# Patient Record
Sex: FEMALE | Race: WHITE | NOT HISPANIC OR LATINO | Employment: FULL TIME | URBAN - METROPOLITAN AREA
[De-identification: names, ages, dates, MRNs, and addresses within clinical notes are randomized per-mention and may not be internally consistent; named-entity substitution may affect disease eponyms.]

---

## 2023-03-02 LAB
ACTIVATED PARTIAL THROMBOPLASTIN TIME IN PPP BY COAGULATION ASSAY: 38 SEC (ref 26–39)
ALANINE AMINOTRANSFERASE (SGPT) (U/L) IN SER/PLAS: 20 U/L (ref 7–45)
ALBUMIN (G/DL) IN SER/PLAS: 4.5 G/DL (ref 3.4–5)
ALKALINE PHOSPHATASE (U/L) IN SER/PLAS: 49 U/L (ref 33–110)
AMPHETAMINE (PRESENCE) IN URINE BY SCREEN METHOD: NORMAL
ANION GAP IN SER/PLAS: 13 MMOL/L (ref 10–20)
ASPARTATE AMINOTRANSFERASE (SGOT) (U/L) IN SER/PLAS: 16 U/L (ref 9–39)
BARBITURATES PRESENCE IN URINE BY SCREEN METHOD: NORMAL
BASOPHILS (10*3/UL) IN BLOOD BY AUTOMATED COUNT: 0.04 X10E9/L (ref 0–0.1)
BASOPHILS/100 LEUKOCYTES IN BLOOD BY AUTOMATED COUNT: 0.5 % (ref 0–2)
BENZODIAZEPINE (PRESENCE) IN URINE BY SCREEN METHOD: NORMAL
BILIRUBIN TOTAL (MG/DL) IN SER/PLAS: 0.5 MG/DL (ref 0–1.2)
C PEPTIDE (NG/ML) IN SER/PLAS: 4.7 NG/ML (ref 0.7–3.9)
CALCIDIOL (25 OH VITAMIN D3) (NG/ML) IN SER/PLAS: 30 NG/ML
CALCIUM (MG/DL) IN SER/PLAS: 9.8 MG/DL (ref 8.6–10.3)
CANNABINOIDS IN URINE BY SCREEN METHOD: NORMAL
CARBON DIOXIDE, TOTAL (MMOL/L) IN SER/PLAS: 29 MMOL/L (ref 21–32)
CHLORIDE (MMOL/L) IN SER/PLAS: 102 MMOL/L (ref 98–107)
CHOLESTEROL (MG/DL) IN SER/PLAS: 115 MG/DL (ref 0–199)
CHOLESTEROL IN HDL (MG/DL) IN SER/PLAS: 39.2 MG/DL
CHOLESTEROL/HDL RATIO: 2.9
COBALAMIN (VITAMIN B12) (PG/ML) IN SER/PLAS: 422 PG/ML (ref 211–911)
COCAINE (PRESENCE) IN URINE BY SCREEN METHOD: NORMAL
CREATININE (MG/DL) IN SER/PLAS: 0.67 MG/DL (ref 0.5–1.05)
DRUG SCREEN COMMENT URINE: NORMAL
EOSINOPHILS (10*3/UL) IN BLOOD BY AUTOMATED COUNT: 0.14 X10E9/L (ref 0–0.7)
EOSINOPHILS/100 LEUKOCYTES IN BLOOD BY AUTOMATED COUNT: 1.9 % (ref 0–6)
ERYTHROCYTE DISTRIBUTION WIDTH (RATIO) BY AUTOMATED COUNT: 13.8 % (ref 11.5–14.5)
ERYTHROCYTE MEAN CORPUSCULAR HEMOGLOBIN CONCENTRATION (G/DL) BY AUTOMATED: 33 G/DL (ref 32–36)
ERYTHROCYTE MEAN CORPUSCULAR VOLUME (FL) BY AUTOMATED COUNT: 87 FL (ref 80–100)
ERYTHROCYTES (10*6/UL) IN BLOOD BY AUTOMATED COUNT: 4.71 X10E12/L (ref 4–5.2)
ESTIMATED AVERAGE GLUCOSE FOR HBA1C: 120 MG/DL
FENTANYL URINE: NORMAL
FERRITIN (UG/LL) IN SER/PLAS: 324 UG/L (ref 8–150)
FOLATE (NG/ML) IN SER/PLAS: >22.3 NG/ML
GFR FEMALE: >90 ML/MIN/1.73M2
GLUCOSE (MG/DL) IN SER/PLAS: 84 MG/DL (ref 74–99)
HEMATOCRIT (%) IN BLOOD BY AUTOMATED COUNT: 40.9 % (ref 36–46)
HEMOGLOBIN (G/DL) IN BLOOD: 13.5 G/DL (ref 12–16)
HEMOGLOBIN A1C/HEMOGLOBIN TOTAL IN BLOOD: 5.8 %
IMMATURE GRANULOCYTES/100 LEUKOCYTES IN BLOOD BY AUTOMATED COUNT: 0.3 % (ref 0–0.9)
INR IN PPP BY COAGULATION ASSAY: 1 (ref 0.9–1.1)
IRON (UG/DL) IN SER/PLAS: 67 UG/DL (ref 35–150)
IRON BINDING CAPACITY (UG/DL) IN SER/PLAS: 363 UG/DL (ref 240–445)
IRON SATURATION (%) IN SER/PLAS: 18 % (ref 25–45)
LDL: 55 MG/DL (ref 0–99)
LEUKOCYTES (10*3/UL) IN BLOOD BY AUTOMATED COUNT: 7.5 X10E9/L (ref 4.4–11.3)
LYMPHOCYTES (10*3/UL) IN BLOOD BY AUTOMATED COUNT: 2.34 X10E9/L (ref 1.2–4.8)
LYMPHOCYTES/100 LEUKOCYTES IN BLOOD BY AUTOMATED COUNT: 31.4 % (ref 13–44)
METHADONE (PRESENCE) IN URINE BY SCREEN METHOD: NORMAL
MONOCYTES (10*3/UL) IN BLOOD BY AUTOMATED COUNT: 0.49 X10E9/L (ref 0.1–1)
MONOCYTES/100 LEUKOCYTES IN BLOOD BY AUTOMATED COUNT: 6.6 % (ref 2–10)
NEUTROPHILS (10*3/UL) IN BLOOD BY AUTOMATED COUNT: 4.42 X10E9/L (ref 1.2–7.7)
NEUTROPHILS/100 LEUKOCYTES IN BLOOD BY AUTOMATED COUNT: 59.3 % (ref 40–80)
OPIATES (PRESENCE) IN URINE BY SCREEN METHOD: NORMAL
OXYCODONE (PRESENCE) IN URINE BY SCREEN METHOD: NORMAL
PARATHYRIN INTACT (PG/ML) IN SER/PLAS: 52.5 PG/ML (ref 18.5–88)
PHENCYCLIDINE (PRESENCE) IN URINE BY SCREEN METHOD: NORMAL
PLATELETS (10*3/UL) IN BLOOD AUTOMATED COUNT: 317 X10E9/L (ref 150–450)
POTASSIUM (MMOL/L) IN SER/PLAS: 3.9 MMOL/L (ref 3.5–5.3)
PROTEIN TOTAL: 7.6 G/DL (ref 6.4–8.2)
PROTHROMBIN TIME (PT) IN PPP BY COAGULATION ASSAY: 11.8 SEC (ref 9.8–13.4)
SODIUM (MMOL/L) IN SER/PLAS: 140 MMOL/L (ref 136–145)
THYROTROPIN (MIU/L) IN SER/PLAS BY DETECTION LIMIT <= 0.05 MIU/L: 2.99 MIU/L (ref 0.44–3.98)
TRIGLYCERIDE (MG/DL) IN SER/PLAS: 104 MG/DL (ref 0–149)
UREA NITROGEN (MG/DL) IN SER/PLAS: 17 MG/DL (ref 6–23)
VLDL: 21 MG/DL (ref 0–40)

## 2023-03-03 LAB — H. PYLORI UBIT: NEGATIVE

## 2023-03-05 LAB
COPPER: 137.6 UG/DL (ref 80–155)
ZINC,SERUM OR PLASMA: 83 UG/DL (ref 60–120)

## 2023-03-08 LAB
COTININE BLOOD QUANTITATIVE: <5 NG/ML
NICOTINE BLOOD QUANTITATIVE: <5 NG/ML

## 2023-03-09 LAB — VITAMIN B1, WHOLE BLOOD: 171 NMOL/L (ref 70–180)

## 2023-05-01 ENCOUNTER — HOSPITAL ENCOUNTER (OUTPATIENT)
Dept: DATA CONVERSION | Facility: HOSPITAL | Age: 43
End: 2023-05-01
Attending: SURGERY | Admitting: SURGERY
Payer: COMMERCIAL

## 2023-05-01 DIAGNOSIS — K29.50 UNSPECIFIED CHRONIC GASTRITIS WITHOUT BLEEDING: ICD-10-CM

## 2023-05-01 DIAGNOSIS — Z79.4 LONG TERM (CURRENT) USE OF INSULIN (MULTI): ICD-10-CM

## 2023-05-01 DIAGNOSIS — K31.89 OTHER DISEASES OF STOMACH AND DUODENUM: ICD-10-CM

## 2023-05-01 DIAGNOSIS — R12 HEARTBURN: ICD-10-CM

## 2023-05-01 DIAGNOSIS — Z01.818 ENCOUNTER FOR OTHER PREPROCEDURAL EXAMINATION: ICD-10-CM

## 2023-05-01 DIAGNOSIS — E66.01 MORBID (SEVERE) OBESITY DUE TO EXCESS CALORIES (MULTI): ICD-10-CM

## 2023-05-05 LAB
COMPLETE PATHOLOGY REPORT: NORMAL
CONVERTED CLINICAL DIAGNOSIS-HISTORY: NORMAL
CONVERTED FINAL DIAGNOSIS: NORMAL
CONVERTED FINAL REPORT PDF LINK TO COPY AND PASTE: NORMAL
CONVERTED GROSS DESCRIPTION: NORMAL

## 2023-10-04 NOTE — H&P (VIEW-ONLY)
Diagnoses/Problems  Assessed    · Bariatric surgery status (V45.86) (Z98.84)   · Chronic GERD (530.81) (K21.9)   · Essential hypertension (401.9) (I10)   · BP well controlled on current meds.   · Obstructive sleep apnea (327.23) (G47.33)   · Moderate -severe       Awaiting treatment   · Morbid obesity with BMI of 50.0-59.9, adult (278.01,V85.43) (E66.01,Z68.43)   · Bariatric sx planned   · Pre-bariatric surgery nutrition evaluation (V65.3) (Z71.3)    *Orders   Bariatric surgery status    · Start: Omeprazole 40 MG Oral Capsule Delayed Release; TAKE 1 CAPSULE Daily open  capsule and sprinkle on sugar free applesauce, pudding or yogurt   · CARE AFTER WEIGHT LOSS SURGERY  - ABIOLA; Status:In Progress;   Done:  [id number]   · AMELIA-EN-Y GASTRIC BYPASS, LAPAROSCOPIC SURGERY  - ABIOLA; Status:In  Progress;   Done: [id number]  Encounter for deep vein thrombosis (DVT) prophylaxis, Morbid obesity with BMI of  50.0-59.9, adult    · Start: Sharps ; please give patient a sharps container with their Lovenox   · Start: Alcohol Pads 70 % Pad; Wipe area for 15 seconds and allow to dry priot to injecting  Enoxaparin   · Start: Enoxaparin Sodium 60 MG/0.6ML Injection Solution Prefilled Syringe; inject 0.6 ML  daily. Inject one syringe subQ daily for 28 days post op. Rotate injection  sites  Post-operative nausea and vomiting    · Start: Ondansetron 4 MG Oral Tablet Disintegrating; 1-2 tablets every 6-8 hours as  needed for nausea    Bariatric surgery status (V45.86) (Z98.84)          Patient Discussion/Summary    Surgery planned: a Amelia-en-Y gastric bypass surgery.   Pre Op Patient Discussion   Reread your pre op manual to be familiar with pre op and post op expectations.    Follow the pre op diet exactly so your liver shrinks down prior to your surgery.    Obtain the over the counter items you need in the home prior to your surgery. Purchase a variety of clear and full liquids as your taste may differ after surgery. Freeze  foods in ice cube trays so you have meals ready for after surgery.    your post op medications today!     Be certain you have an appointment with your medical doctors who will need to tweak your prescription medications after surgery as you drop weight rapidly.     Make sure you have follow up appointments 1-2 weeks after surgery and at the 6 week sushant.    Call phone # [phone] for any questions.           You are scheduled for Gastric Bypass with Dr. Weiss on 10/11/2023    YOU DO NOT NEED TO DO A BOWEL PREP. You will be on clear liquids only starting the day prior to surgery. Please refer to your final pre op book/RD instructions.     You will receive a phone call the day prior to surgery with your OR arrival time.  Be sure to read over your Pre-Op book for final preparation for surgery.  Make a shopping list and  your supplements prior to surgery.     Prescriptions for Omeprazole (antacid), Oxycodone (pain), and Ondansetron (anti-Nausea) have been sent to your retail pharmacy. Pick these up if you have not yet done so - these are for after surgery.    Be sure to take the omeprazole every day for six months starting when you get home from the hospital. Open the capsule and sprinkle over SF applesauce, pudding or yogurt. DO NOT MISS A DOSE.     Call with any questions! [phone] for Daphne.        Provider Impressions    Patient is ready for surgery, scheduled for Robotic/laparoscopic gastric bypass.  preop assessments reviewed, labs reviewed and no contraindications noted to proceed with surgery.   RBAs discussed again.   All questions answered.   Periop care discussed.   Consent obtained.   Scripts given.   Diet, exercise, life style modification for long term success reiterated.   Vitamin supplementation and dietitian followup reiterated.   Will proceed as planned.   Risks including and not limited to bleeding, nausea, vomiting, weight regain, suboptimal weight loss, nutritional deficiencies,  severe vitamin deficiencies, hair loss, lose skin, psychiatric issues,internal hernia, bowel obstruction, ulcer, inability to take certain medications, drinking problem and even death discussed and all question answered.   will place on extended DVT ppx for high BMI.      Chief Complaint    The patient is being seen for pre operative visit. Type of surgery: Amelia-y Gastric Bypass . Surgery date: 10/11/23.     Final pre-op visit -- BARBY DE [facility]ENTE 10/11/23      Adult Risk ScreeningThere are no spiritual/cultural practices/values/needs that are important to know   Initial Fall Risk Screening:   RICKEY has not fallen in the last 6 months. RICKEY does not have a fear of falling. She does not need assistance with sitting, standing or walking. Does not need assistance walking in her home. She does not need assistance in an unfamiliar setting. The patient is not using an assistive device.       Living Will.   Living Will: No living will on file.   Healthcare POA: No healthcare proxy on file.    Tobacco Screening: RICKEY does not use tobacco.        History of Present Illness    Type of Surgery: lap amelia-en-y gastric bypass, surgery Date: 10/11/2023.   Weight:.   Initial weight: 332 lbs.    Last visit weight: 329 lbs.    The patient's weight was 332 lbs at pre-op visit.   Ideal weight 143 lbs.   Target body weight 183 lbs.   Severity of obesity is Class 3 which is a BMI of greater than or equal to 40.     The following clearances were received - Cardiac: cleared and Psych: cleared.   Sleep study results: severe apnea and compliant with CPAP.   EGD findings: Z line was irregular @ 36cm ; patchy, mildly erythematous mucosa, erythematous duodenopathy.   no hiatal hernia, Cardoso's esophagus not present.   H. Pylori is negative.     Lab results: Hgb: 13.5, Iron: 67/363/18, B12: 422, Vitamin D: 30, Hbg A1C: 5.8 and B1: 171.   Lifestyle: tox screen negative.     Comorbidities: anxiety, back pain, depressed mood, high cholesterol, joint pain,  urinary incontinence and hypertension controlled with oral meds.   43 year old female presenting today for final pre-op visit for a planned zulay-en-y gastric bypass on 10/11/2023. Patient has approximate BMI of 49 with related comorbidities of morbid obesity, GERD, hypothyroidism, hyperlipidemia, hypertension,Severe YONAS (on CPAP), anxiety and depression. Patient has met insurance requirements and has been medically optimized for surgery.         Review of Systems    Constitutional: no chills and no fever.   Eyes: no blurred vision and no eyesight problems.   ENT: no hearing loss, no nasal congestion, no nasal discharge, no hoarseness and no sore throat.   Cardiovascular: no chest pain, no intermittent leg claudication, no lower extremity edema, no palpitations and no syncope.   Respiratory: no cough, no shortness of breath during exertion, no shortness of breath at rest and no wheezing.   Gastrointestinal: no abdominal pain, no blood in stools, no constipation, no diarrhea, no melena, no nausea, no rectal pain and no vomiting.   Genitourinary: no dysuria, no change in urinary frequency, no urinary hesitancy, no feelings of urinary urgency and no vaginal discharge.   Musculoskeletal: no arthralgias, no back pain and no myalgias.   Integumentary: no new skin lesions and no rashes.   Neurological: no difficulty walking, no headache, no limb weakness, no numbness and no tingling.   Psychiatric: no anxiety, no depression, no substance use disorders and no anhedonia.   Endocrine: no recent weight gain and no recent weight loss.   Hematologic/Lymphatic: no tendency for easy bruising and no swollen glands.   All other systems have been reviewed and are negative for complaint.   The full, 10+ multi-organ review of systems, is within normal limits with the exception of what is noted above in HPI.       *Active Problems   Problems    · Anxiety (300.00) (F41.9)   · Chronic GERD (530.81) (K21.9)   · Coronary artery calcification  (414.00,414.4) (I25.10,I25.84)   · 1 AG unit per 9/2022 study      Chnage to HI statin   · Essential hypertension (401.9) (I10)   · BP well controlled on current meds.   · Morbid obesity with BMI of 50.0-59.9, adult (278.01,V85.43) (E66.01,Z68.43)   · Bariatric sx planned   · Obstructive sleep apnea (327.23) (G47.33)   · Moderate -severe       Awaiting treatment   · Pre-op evaluation (V72.84) (Z01.818)   · Pt scheduled for bariatric sx      Per Revised Cardiac Risk Index she is low risk for CV M/M.      Also unrenmarkable cardiac exam and EKG      Proceed with SX with no need for additional testing    Bariatric surgery status (V45.86) (Z98.84)          Past Medical History  Problems    · History of Anxiety and depression (300.00,311) (F41.9,F32.A)   · Resolved Date: 06 Feb 2023   · History of Chronic GERD (530.81) (K21.9)   · Resolved Date: 06 Feb 2023   · History of Coital headache (339.82) (G44.82)   · Resolved Date: 06 Feb 2023   · History of Daytime somnolence (780.54) (R40.0)   · Resolved Date: 06 Feb 2023   · History of Elevated liver function tests (790.6) (R79.89)   · Resolved Date: 06 Feb 2023   · History of Encounter for removal and reinsertion of intrauterine contraceptive device  (V25.13) (Z30.433)   · Resolved Date: 06 Feb 2023   · History of depression (V11.8) (Z86.59)   · History of headache (V13.89) (Z87.898)   · Resolved Date: 06 Feb 2023   · History of hematuria (V13.09) (Z87.448)   · Resolved Date: 06 Feb 2023   · History of hypercholesterolemia (V12.29) (Z86.39)   · Resolved Date: 06 Feb 2023   · History of hypertension (V12.59) (Z86.79)   · History of hypothyroidism (V12.29) (Z86.39)   · Resolved Date: 06 Feb 2023   · History of morbid obesity (V13.89) (Z86.39)   · Resolved Date: 06 Feb 2023   · History of screening mammography (V15.89) (Z92.89)   · Resolved Date: 06 Feb 2023   · History of thyroid disorder (V12.29) (Z86.39)   · History of Left ureteral calculus (592.1) (N20.1)   · Resolved  Date: 06 Feb 2023   · History of New onset headache (784.0) (R51.9)   · Resolved Date: 06 Feb 2023   · History of Pelvic pain in female (625.9) (R10.2)   · Resolved Date: 06 Feb 2023   · History of Recurrent UTI (599.0) (N39.0)   · Resolved Date: 06 Feb 2023   · History of Seasonal allergies (477.9) (J30.2)   · History of Urge and stress incontinence (788.33) (N39.46)   · Resolved Date: 06 Feb 2023   · History of Well woman exam with routine gynecological exam (V72.31) (Z01.419)   · Resolved Date: 06 Feb 2023    Surgical History  Problems    · History of Cholecystectomy Laparoscopic    Family History  Mother    · No pertinent family history  Father    · Family history of YONAS on CPAP  Grandparent    · Family history of cardiac disorder (V17.49) (Z82.49)   · Family history of cerebrovascular accident (CVA) (V17.1) (Z82.3)   · Family history of diabetes mellitus (V18.0) (Z83.3)    Social History  Problems    · Caffeine use (V49.89) (Z78.9)   · Former smoker (V15.82) (Z87.891)   ·    · Occasional alcohol use   · Parent   · Rarely consumes alcohol (V49.89) (Z78.9)   · Uses medical marijuana    Allergies  Medication    · No Known Drug Allergies  Recorded By: Kylie Parks; 2/20/2023 9:55:13 AM  NonMedication    · Other  Recorded By: Erin Andrade; 12/22/2016 10:09:53 AM   · Pollen  Recorded By: Erin Andrade; 12/22/2016 10:09:53 AM    Current Meds    Medication Name Instruction   B-12 TABS TAKE 1 TABLET DAILY AS DIRECTED.   Fish Oil CAPS TAKE 1 CAPSULE Daily   hydroCHLOROthiazide 25 MG Oral Tablet TAKE 1 TABLET DAILY.   Levothyroxine Sodium 50 MCG Oral Tablet TAKE 1 TABLET DAILY AS DIRECTED.   Mirena (52 MG) IUD To be removed 7/29/2025   Multiple Vitamins/Womens Oral Tablet TAKE 1 TABLET DAILY.   Omeprazole 40 MG Oral Capsule Delayed Release TAKE 1 CAPSULE Daily In The Morning Must open capsule and sprinkle on Sugar Free pudding or applesauce.  Do not crush or chew beads.   Rosuvastatin Calcium 40 MG Oral Tablet  TAKE 1 TABLET BY MOUTH EVERY DAY   Sertraline HCl - 50 MG Oral Tablet TAKE 1 TABLET BY MOUTH DAILY     Vitals  Vital Signs    Recorded: 28Sep2023 07:53AM   Heart Rate 83   Systolic 127, LUE, Sitting   Diastolic 83, LUE, Sitting   Blood Pressure Cuff Size Large   Height 5 ft 7 in   Weight 338 lb 1 oz   BMI Calculated 52.95 kg/m2   BSA Calculated 2.53   Tobacco Use b) No   Falls Screening (Age 18+) a) No falls within the last year   O2 Saturation 96     Physical Exam    Constitutional - General appearance: In no acute distress, well appearing and well nourished.   Obese with central distribution.   Eyes Conjunctiva and lids: No erythema, swelling or discharge., Ocular Motility Exam: EOMI   Neck - Neck Circumference: large neck circumference   Pulmonary - Respiratory effort: Normal respiration.   Cardiovascular - Examination of extremities for edema and/or varicosities: No peripheral edema   Abdomen - Abdomen: Non-tender, no abdominal masses   Lymphatic - No lymphedema in the lower extremities.   Musculoskeletal - Muscle strength/tone: Normal   Skin - no venous stasis   Neurologic - Coordination: Normal gait   Psychiatric - Orientation to person, place, and time: Normal, Mood and affect: Normal      Results/Data  Surgical Pathology 01May2023 07:44AM Esau Dhaliwal     Test Name Result Flag Reference   Case Surgical Pathology (Report)     Name RICKEY BECK                                                    Accession #: [id number]       Pathologist:          OFELIA CALDERÓN MD  Date of Procedure:  5/1/2023  Date Received:     5/1/2023  Date Reported      5/5/2023  Submitting Physician:  ESAU DHALIWAL MD  Location:          [facility]   Copy To/Referring/Attending:  MIGUEL ANGEL POE MD Other External #                                         FINAL DIAGNOSIS  A. STOMACH, BIOPSY:  --MILD CHRONIC NONSPECIFIC GASTRITIS, NO HELICOBACTER IDENTIFIED.    B. DUODENUM, BIOPSY:  --SCANT FRAGMENT OF SMALL INTESTINAL MUCOSA  "DEMONSTRATING HISTOPATHOLOGICAL  FEATURES SUGGESTIVE OF PEPTIC DUODENITIS.                                                                                                                                                                                                                                                       Electronically Signed Out By OFELIA CALDERÓN MD/OPAL  By the signature on this report, the individual or group listed as making the  Final Interpretation/Diagnosis certifies that they have reviewed this case.  Diagnostic interpretation performed at [place] [address]         Clinical History:  Duodenitis, gastritis  A,B) Rule out H.Pylori      Specimens Submitted As:  A: GASTRIC BIOPSY,COLD BIOPSY   B: DUODENAL BIOPSY,COLD BIOPSY     Gross Description:  A: Received in formalin, labeled with the patient's name and hospital number  and \"A gastric biopsies\", are multiple fragments of tan, soft tissue  aggregating to 0.9 x 0.3 x 0.2 cm. The specimen is submitted in toto in one  cassette.  MKM    B: Received in formalin, labeled with the patient's name and hospital number  and \"B duodenal biopsies\", is 1 fragment of tan, soft tissue measuring 0.4 x  0.3 x 0.2 cm. The specimen is submitted in toto in one cassette.  [facility]      mkm/5/3/2023             [facility]  Department of Pathology   [address] [place] [address]     Endoscopy - Upper GI 01May2023 07:17AM Non Ambulatory, Provider     Test Name Result Flag Reference   Provimage      [email or web address]   Upper GI Endoscopy (Report)     Patient Name: Meli Tsang  Procedure Date: 5/1/2023 7:17 AM  MRN: [id number]  Account Number: [id number]  YOB: 1980  Admit Type: Outpatient  Site: [facility] Endoscopy Room 3  Ethnicity: Not  or   Race: White  Attending MD: Jacinto Weiss MD, " [id number]  Procedure:       Upper GI endoscopy  Indications:      Heartburn, Preoperative assessment for bariatric               surgery to treat morbid obesity, obesity  Providers:       Jacinto Weiss MD (Doctor), Elvia Christianson RN               (Nurse) , Dalia Null, Technician  Referring:       Gigi Mota MD  Medicines:       Monitored Anesthesia Care  Complications:     No immediate complications.  Procedure:       Pre-Anesthesia Assessment:              - Prior to the procedure, a History and Physical was               performed, and patient medications and allergies were               reviewed. The patient's tolerance of previous               anesthesia was also reviewed. The risks and benefits               of the procedure and the sedation options and risks               were discussed with the patient. All questions were               answered, and informed consent was obtained. Prior               Anticoagulants: The patient has taken no anticoagulant               or antiplatelet agents. ASA Grade Assessment: III - A               patient with severe systemic disease. After reviewing               the risks and benefits, the patient was deemed in               satisfactory condition to undergo the procedure.              After obtaining informed consent, the endoscope was               passed under direct vision. Throughout the procedure,               the patient's blood pressure, pulse, and oxygen               saturations were monitored continuously. The endoscope               was introduced through the mouth, and advanced to the               second part of duodenum. The upper GI endoscopy was               accomplished without difficulty. The patient tolerated               the procedure well.  Findings:     The Z-line was irregular and was found 36 cm from the incisors.     The gastroesophageal flap valve was visualized endoscopically and      classified as Hill Grade II (fold present,  opens with respiration).     Patchy mildly erythematous mucosa without bleeding was found in the      prepyloric region of the stomach. Biopsies were taken with a cold      forceps for Helicobacter pylori testing. Estimated blood loss was      minimal.     Patchy mildly erythematous mucosa without active bleeding and with no      stigmata of bleeding was found in the ampulla and in the duodenal bulb.      Biopsies were taken with a cold forceps for histology. Estimated blood      loss was minimal.     The first portion of the duodenum and second portion of the duodenum      were normal.  Estimated Blood Loss:     Estimated blood loss was minimal.  Impression:      - Z-line irregular, 36 cm from the incisors.              - Gastroesophageal flap valve classified as Hill Grade               II (fold present, opens with respiration).              - Erythematous mucosa in the prepyloric region of the               stomach. Biopsied.              - Erythematous duodenopathy. Biopsied.              - Normal first portion of the duodenum and second               portion of the duodenum.  Recommendation:    - Resume previous diet.              - Continue present medications.              - Await pathology results.  Procedure Code(s):   --- Professional ---              36348, Esophagogastroduodenoscopy, flexible,               transoral; with biopsy, single or multiple              --- Technical ---              78411, Esophagogastroduodenoscopy, flexible,               transoral; with biopsy, single or multiple  Diagnosis Code(s):   --- Professional ---              K22.89, Other specified disease of esophagus              K31.89, Other diseases of stomach and duodenum              R12, Heartburn              Z01.818, Encounter for other preprocedural examination              E66.01, Morbid (severe) obesity due to excess calories              --- Technical ---              K22.89, Other specified disease of esophagus               K31.89, Other diseases of stomach and duodenum              R12, Heartburn              Z01.818, Encounter for other preprocedural examination              E66.01, Morbid (severe) obesity due to excess calories  CPT copyright 2021 [employer] Association. All rights reserved.  The codes documented in this report are preliminary and upon  review may   be revised to meet current compliance requirements.  Attending Participation:     I was present and participated during the entire procedure, including      non-key portions.  Jacinto Weiss MD  5/1/2023 8:00:04 AM  This report has been signed electronically.  Number of Addenda: 0     C Peptide, Serum 02Mar2023 11:49AM Jacinto Weiss     Test Name Result Flag Reference   C Peptide, Serum 4.7 ng/mL H 0.7 - 3.9     Coagulation Screen 02Mar2023 11:49AM Jacinto Weiss     Test Name Result Flag Reference   PROTHROMBIN TIME 11.8 sec  9.8 - 13.4   PT, INR 1.0  0.9 - 1.1   APTT 38 sec  26 - 39   THE APTT IS NO LONGER USED FOR MONITORING     UNFRACTIONATED HEPARIN THERAPY.    FOR MONITORING HEPARIN THERAPY,     USE THE HEPARIN ASSAY.     Complete Blood Count + Differential 02Mar2023 11:49AM Jacinto Weiss     Test Name Result Flag Reference   White Blood Cell Count 7.5 x10E9/L  4.4 - 11.3   Red Blood Cell Count 4.71 x10E12/L  See Below   Reference Range: 4.00 - 5.20   Hemoglobin 13.5 g/dL  See Below   Reference Range: 12.0 - 16.0   HCT 40.9 %  See Below   Reference Range: 36.0 - 46.0   MCV 87 fL  80 - 100   MCHC 33.0 g/dL  See Below   Reference Range: 32.0 - 36.0   Platelet Count 317 x10E9/L  150 - 450   RDW-CV 13.8 %  See Below   Reference Range: 11.5 - 14.5   Neutrophil % 59.3 %  See Below   Reference Range: 40.0 - 80.0   % Automated Immature Gran 0.3 %  0.0 - 0.9   Immature Granulocyte Count (IG) includes promyelocytes,    myelocytes and metamyelocytes but does not include bands.   Percent differential counts (%) should be interpreted in the   context of  the absolute cell counts (cells/L).   Lymphocyte % 31.4 %  See Below   Reference Range: 13.0 - 44.0   Monocyte % 6.6 %  2.0 - 10.0   Eosinophil % 1.9 %  0.0 - 6.0   Basophil % 0.5 %  0.0 - 2.0   Neutrophil Count 4.42 x10E9/L  See Below   Reference Range: 1.20 - 7.70   Lymphocyte Count 2.34 x10E9/L  See Below   Reference Range: 1.20 - 4.80   Monocyte Count 0.49 x10E9/L  See Below   Reference Range: 0.10 - 1.00   Eosinophil Count 0.14 x10E9/L  See Below   Reference Range: 0.00 - 0.70   Basophil Count 0.04 x10E9/L  See Below   Reference Range: 0.00 - 0.10     Comprehensive Metabolic Panel 02Mar2023 11:49AM Jacinto Weiss     Test Name Result Flag Reference   Glucose, Serum 84 mg/dL  74 - 99   Sodium, Serum 140 mmol/L  136 - 145   POTASSIUM 3.9 mmol/L  3.5 - 5.3   Chloride, Serum 102 mmol/L  98 - 107   Bicarbonate, Serum 29 mmol/L  21 - 32   Anion Gap, Serum 13 mmol/L  10 - 20   Blood Urea Nitrogen, Serum 17 mg/dL  6 - 23   CREATININE 0.67 mg/dL  See Below   Reference Range: 0.50 - 1.05   Calcium, Serum 9.8 mg/dL  8.6 - 10.3   Albumin, Serum 4.5 g/dL  3.4 - 5.0   ALKALINE PHOSPHATASE 49 U/L  33 - 110   Protein, Total Serum 7.6 g/dL  6.4 - 8.2   Bilirubin, Serum Total 0.5 mg/dL  0.0 - 1.2   ALT (SGPT), Serum 20 U/L  7 - 45   Patients treated with Sulfasalazine may generate    falsely decreased results for ALT.   GFR FEMALE >90 mL/min/1.73m2  >90   CALCULATIONS OF ESTIMATED GFR ARE PERFORMED   USING THE 2021 CKD-EPI STUDY REFIT EQUATION   WITHOUT THE RACE VARIABLE FOR THE IDMS-TRACEABLE   CREATININE METHODS.    [email or web address]   AST 16 U/L  9 - 39     Copper, Serum 02Mar2023 11:Jacinto Everett     Test Name Result Flag Reference   Copper, Serum 137.6 ug/dL  80.0-155.0   INTERPRETIVE INFORMATION: Copper, Serum or Plasma  Elevated results may be due to skin or collection-related   contamination, including the use of a noncertified metal-free   collection/transport  tube. If contamination concerns exist due to   elevated levels of serum/plasma copper, confirmation with a second   specimen collected in a certified metal-free tube is recommended.  Serum copper may be elevated with infection, inflammation, stress,   and copper supplementation. In females, elevated copper may also   be caused by oral contraceptives and pregnancy (concentrations may   be elevated up to 3 times normal during the third trimester).  This test was developed and its performance characteristics   determined by [employer]. It has not been cleared or   approved by the US Food and Drug Administration. This test was   performed in a [facility] certified laboratory and is intended for   clinical purposes.  Performed By: [facility]  [address] [place] [id number]  : Carroll Burciaga MD, PhD     Drug Screen, Urine With Reflex To Confirmation 02Mar2023 11:49AM Jacinto Weiss     Test Name Result Flag Reference   DRUG SCREEN COMMENT SEE BELOW     Drug screen results are presumptive and should not be used to assess   compliance with prescribed medication. Definitive confirmatory drug testing   has been added to this sample for any positive screen result and will be   reported separately.   .  Toxicology screening results are reported qualitatively. The concentration   must be greater than or equal to the cutoff to be reported as positive. The   concentration at which the screening test can detect an individual drug or   metabolite varies. The absence of expected drug(s) and/or drug metabolite(s)   may indicate non-compliance, inappropriate timing of specimen collection   relative to drug administration, poor drug absorption, diluted/adulterated   urine, or limitations of testing. For medical purposes only; not valid for   forensic use.   .  Interpretive questions should be directed to the laboratory medical   directors.   AMPHETAMINE SCREEN,URINE PRESUMPTIVE NEGATIVE   NEGATIVE   CUTOFF LEVEL:  500 NG/ML   Cross-reactivity has been reported with high concentrations   of the following drugs: buproprion, chloroquine, chlorpromazine,   ephedrine, mephentermine, fenfluramine, phentermine,   phenylpropanolamine, pseudoephedrine, and propranolol.   Barbiturate Screen, Urine PRESUMPTIVE NEGATIVE  NEGATIVE   CUTOFF LEVEL: 200 NG/ML   BENZODIAZEPINE SCREEN,URINE PRESUMPTIVE NEGATIVE  NEGATIVE   CUTOFF LEVEL: 200 NG/ML   Cannabinoid Screen, Urine PRESUMPTIVE NEGATIVE  NEGATIVE   CUTOFF LEVEL: 50 NG/ML   Cocaine Metabolite Screen, Urine PRESUMPTIVE NEGATIVE  NEGATIVE   CUTOFF LEVEL: 150 NG/ML   FENTANYL SCREEN,URINE PRESUMPTIVE NEGATIVE  NEGATIVE   CUTOFF LEVEL:  5 NG/ML   METHADONE SCREEN,URINE PRESUMPTIVE NEGATIVE  NEGATIVE   CUTOFF LEVEL: 150 NG/ML   The metabolite L-alpha-acetylmethadol (LAAM) is not   detected by this method in concentrations that would   be found in the urine of patients on LAAM therapy.   OPIATE SCREEN,URINE PRESUMPTIVE NEGATIVE  NEGATIVE   CUTOFF LEVEL: 300 NG/ML   The opiate screen does not detect fentanyl, meperidine, or    tramadol. Oxycodone is not consistently detected (refer to   Oxycodone Screen, Urine result).   OXYCODONE SCREEN,URINE PRESUMPTIVE NEGATIVE  NEGATIVE   CUTOFF LEVEL: 100 NG/ML    This test will accurately detect both oxycodone and oxymorphone.   PCP SCREEN,URINE PRESUMPTIVE NEGATIVE  NEGATIVE   CUTOFF LEVEL:  25 NG/ML   Cross-reactivity has been reported with dextromethorphan.     Ferritin, Serum 02Mar2023 11:49AM Jacinto Weiss     Test Name Result Flag Reference   Ferritin, Serum 324 ug/L H 8 - 150     Folate, Serum 02Mar2023 11:49AM Jacinto Weiss     Test Name Result Flag Reference   Folate, Serum >22.3 ng/mL  >5.0   Low           <3.4  Borderline 3.4-5.0  Normal        >5.0  .   Patients receiving more than 5 mg/day of biotin may have interference   in test results. A sample should be taken no sooner than eight hours   after previous  dose. Contact the testing laboratory for additional   information.     HELICOBACTER PYLORI BREATH TEST 02Mar2023 11:49AM Jacinto Weiss     Test Name Result Flag Reference   Urea Breath Test NEGATIVE  NEGATIVE   ANTIMICROBIALS, PROTON PUMP INHIBITORS, AND BISMUTH   PREPARATIONS ARE KNOWN TO SUPPRESS H. PYLORI AND   INGESTION OF THESE WITHIN TWO WEEKS PRIOR TO PERFORMING   THE UREA BREATH TEST MAY GIVE FALSE NEGATIVE RESULTS.    POST-TREATMENT MONITORING OF H. PYLORI SHOULD BE PERFORMED   AT LEAST SIX WEEKS AFTER THE END OF TREATMENT. EARLIER   ASSESSMENTS MAY GIVE FALSE RESULTS.     Hemoglobin A1C 02Mar2023 11:49AM Jacinto Weiss     Test Name Result Flag Reference   Hemoglobin A1C, Level 5.8 % A    Diagnosis of Diabetes-Adults   Non-Diabetic: < or = 5.6%   Increased risk for developing diabetes: 5.7-6.4%   Diagnostic of diabetes: > or = 6.5%  .       Monitoring of Diabetes                Age (y)     Therapeutic Goal (%)   Adults:          >18           <7.0   Pediatrics:    13-18           <7.5                   7-12           <8.0                   0- 6            7.5-8.5   American Diabetes Association. Diabetes Care 33(S1), Jan 2010.   Estimated Average Glucose 120 MG/DL       Iron + TIBC, Serum 02Mar2023 11:49AM Jacinto Weiss     Test Name Result Flag Reference   Iron, Serum 67 ug/dL  35 - 150   Total Iron Binding Capacity 363 ug/dL  240 - 445   % Saturation 18 % L 25 - 45     Lipid Panel 02Mar2023 11:49AM Jacinto Weiss     Test Name Result Flag Reference   Cholesterol, Serum 115 mg/dL  0 - 199   .      AGE      DESIRABLE   BORDERLINE HIGH   HIGH     0-19 Y     0 - 169       170 - 199     >/= 200    20-24 Y     0 - 189       190 - 224     >/= 225         >24 Y     0 - 199       200 - 239     >/= 240   **All ranges are based on fasting samples. Specific   therapeutic targets will vary based on patient-specific   cardiac risk.  .   Pediatric guidelines reference:Pediatrics 2011, 128(S5).   Adult guidelines  reference: NCEP ATPIII Guidelines,     KERON 2001, 258:2486-97  .   Venipuncture immediately after or during the    administration of Metamizole may lead to falsely   low results. Testing should be performed immediately   prior to Metamizole dosing.   HDL Cholesterol, Serum 39.2 mg/dL A    .      AGE      VERY LOW   LOW     NORMAL    HIGH       0-19 Y       < 35   < 40     40-45     ----    20-24 Y       ----   < 40       >45     ----      >24 Y       ----   < 40     40-60      >60  .   Cholesterol/HDL Ratio 2.9     REF VALUES  DESIRABLE  < 3.4  HIGH RISK  > 5.0   LDL, Level 55 mg/dL  0 - 99   .                           NEAR      BORD      AGE      DESIRABLE  OPTIMAL    HIGH     HIGH     VERY HIGH     0-19 Y     0 - 109     ---    110-129   >/= 130     ----    20-24 Y     0 - 119     ---    120-159   >/= 160     ----      >24 Y     0 -  99   100-129  130-159   160-189     >/=190  .   VLDL, Serum 21 mg/dL  0 - 40   Triglycerides, Serum 104 mg/dL  0 - 149   .      AGE      DESIRABLE   BORDERLINE HIGH   HIGH     VERY HIGH   0 D-90 D    19 - 174         ----         ----        ----  91 D- 9 Y     0 -  74        75 -  99     >/= 100      ----    10-19 Y     0 -  89        90 - 129     >/= 130      ----    20-24 Y     0 - 114       115 - 149     >/= 150      ----         >24 Y     0 - 149       150 - 199    200- 499    >/= 500  .   Venipuncture immediately after or during the    administration of Metamizole may lead to falsely   low results. Testing should be performed immediately   prior to Metamizole dosing.     Nicotine+Metabolites, Serum 02Mar2023 11:49AM Jacinto Weiss     Test Name Result Flag Reference   NICOTINE <5 ng/mL     Consistent with abstinence from nicotine-containing  products for at least 1 week.  INTERPRETIVE INFORMATION: Nicotine and Metabolites,                             Serum or Plasma,                             Quantitative  Methodology: Quantitative Liquid Chromatography-Tandem Mass    Spectrometry  Positive cutoff: 5 ng/mL  For medical purposes only; not valid for forensic use.   This test is designed to evaluate recent use of   nicotine-containing products.  Passive and active exposure cannot   be discriminated definitively, although a cutoff of 10 ng/mL   cotinine is frequently used for surgery qualification purposes.    For smoking cessation programs or compliance testing, the absence   of expected drug(s) and/or drug metabolite(s) may indicate   non-compliance, inappropriate timing of specimen collection   relative to drug administration, poor drug absorption, or   limitations of testing. This test cannot distinguish between use   of tobacco and purified nicotine products. The concentration value   must be greater than or equal to the cutoff to be reported as   positive.    This test was developed and its performance characteristics   determined by [facility]. It has not been cleared or   approved by the US Food and Drug Administration. This test was   performed in a CLIA certified laboratory and is intended for   clinical purposes.  Performed By: [facility]  [address] [place] [id number]  : Carroll Burciaga MD, PhD   Cotinine, Level <5 ng/mL       Parathormone Intact, Serum 02Mar2023 11:49AM Jacinto Weiss     Test Name Result Flag Reference   Parathormone Intact, Serum 52.5 pg/mL  See Below   Reference Range: 18.5 - 88.0     TSH WITH REFLEX TO FREE T4 IF ABNORMAL 02Mar2023 11:49AM Jacinto Weiss     Test Name Result Flag Reference   Thyroid Stimulating Hormone, Serum 2.99 mIU/L  See Below   Reference Range: 0.44 - 3.98   TSH testing is performed using different testing    methodology at [facility] than at other    [facility]. Direct result comparisons should    only be made within the same method.     Vitamin B1 - Thiamine, Whole Blood 02Mar2023 11:49AM Jacinto Weiss     Test Name Result Flag Reference   Vitamin B1 -  Thiamine Whole Blood 171 nmol/L     INTERPRETIVE INFORMATION: Vitamin B1, Whole Blood  This assay measures the concentration of thiamine diphosphate   (TDP), the primary active form of vitamin B1. Approximately 90   percent of vitamin B1 present in whole blood is TDP. Thiamine and   thiamine monophosphate, which comprise the remaining 10 percent,   are not measured.  This test was developed and its performance characteristics   determined by [facility]. It has not been cleared or   approved by the US Food and Drug Administration. This test was   performed in a CLIA certified laboratory and is intended for   clinical purposes.  Performed By: [facility]  [address] [place] [id number]  : Carroll Burciaga MD, PhD     Vitamin B12, Serum 02Mar2023 11:49AM Jacinto Weiss     Test Name Result Flag Reference   Vitamin B12, Serum 422 pg/mL  211 - 911     Vitamin D 25-Hydroxy 02Mar2023 11:49AM Jacinto Weiss     Test Name Result Flag Reference   Vitamin D 25-Hydroxy, Level 30 ng/mL     .  DEFICIENCY:         < 20   NG/ML  INSUFFICIENCY:      20-29  NG/ML  SUFFICIENCY:         NG/ML    THIS ASSAY ACCURATELY QUANTIFIES THE SUM OF  VITAMIN D3, 25-HYDROXY AND VIT D2,25-HYDROXY.     Zinc, Serum 02Mar2023 11:49AM Jacinto Weiss     Test Name Result Flag Reference   Zinc, Serum 83.0 ug/dL  60.0-120.0   INTERPRETIVE INFORMATION: Zinc, Serum or Plasma  Elevated results may be due to skin or collection-related   contamination, including the use of a noncertified metal-free   collection/transport tube. If contamination concerns exist due to   elevated levels of serum/plasma zinc, confirmation with a second   specimen collected in a certified metal-free tube is recommended.  Circulating zinc concentrations are dependent on albumin status   and are depressed with malnutrition.  Zinc may also be lowered   with infection, inflammation, stress, oral contraceptives, and    pregnancy.  Zinc may be elevated with zinc supplementation or   fasting.  Elevated zinc concentrations may interfere with copper   absorption.   This test was developed and its performance characteristics   determined by [facility]. It has not been cleared or   approved by the US Food and Drug Administration. This test was   performed in a CLIA certified laboratory and is intended for   clinical purposes.  Performed By: [facility]  [address] [place] [id number]  : Carroll Burciaga MD, PhD     Signatures   Electronically signed by : Jacinto Weiss MD; Sep 28 2023 10:05AM EST (Author)

## 2023-10-06 DIAGNOSIS — Z01.818 PREOP TESTING: Primary | ICD-10-CM

## 2023-10-07 PROBLEM — F32.A ANXIETY AND DEPRESSION: Status: ACTIVE | Noted: 2023-10-07

## 2023-10-07 PROBLEM — R40.0 DAYTIME SOMNOLENCE: Status: ACTIVE | Noted: 2023-10-07

## 2023-10-07 PROBLEM — I25.84 CORONARY ARTERY CALCIFICATION: Status: ACTIVE | Noted: 2023-10-07

## 2023-10-07 PROBLEM — R31.9 HEMATURIA: Status: ACTIVE | Noted: 2023-10-07

## 2023-10-07 PROBLEM — N20.1 LEFT URETERAL CALCULUS: Status: ACTIVE | Noted: 2023-10-07

## 2023-10-07 PROBLEM — G44.82 COITAL HEADACHE: Status: ACTIVE | Noted: 2023-10-07

## 2023-10-07 PROBLEM — R79.89 ELEVATED LIVER FUNCTION TESTS: Status: ACTIVE | Noted: 2023-10-07

## 2023-10-07 PROBLEM — K21.9 CHRONIC GERD: Status: ACTIVE | Noted: 2023-10-07

## 2023-10-07 PROBLEM — Z98.890 POST-OPERATIVE NAUSEA AND VOMITING: Status: ACTIVE | Noted: 2023-10-07

## 2023-10-07 PROBLEM — E78.00 HYPERCHOLESTEROLEMIA: Status: ACTIVE | Noted: 2023-10-07

## 2023-10-07 PROBLEM — R11.2 POST-OPERATIVE NAUSEA AND VOMITING: Status: ACTIVE | Noted: 2023-10-07

## 2023-10-07 PROBLEM — I25.10 CORONARY ARTERY CALCIFICATION: Status: ACTIVE | Noted: 2023-10-07

## 2023-10-07 PROBLEM — R10.2 PELVIC PAIN IN FEMALE: Status: ACTIVE | Noted: 2023-10-07

## 2023-10-07 PROBLEM — E66.01 MORBID OBESITY (MULTI): Status: ACTIVE | Noted: 2023-10-07

## 2023-10-07 PROBLEM — I10 ESSENTIAL HYPERTENSION: Status: ACTIVE | Noted: 2023-10-07

## 2023-10-07 PROBLEM — Z98.84 BARIATRIC SURGERY STATUS: Status: ACTIVE | Noted: 2023-10-07

## 2023-10-07 PROBLEM — N39.46 URGE AND STRESS INCONTINENCE: Status: ACTIVE | Noted: 2023-10-07

## 2023-10-07 PROBLEM — F41.9 ANXIETY AND DEPRESSION: Status: ACTIVE | Noted: 2023-10-07

## 2023-10-07 PROBLEM — N39.0 RECURRENT UTI: Status: ACTIVE | Noted: 2023-10-07

## 2023-10-07 PROBLEM — G47.33 OSA (OBSTRUCTIVE SLEEP APNEA): Status: ACTIVE | Noted: 2023-10-07

## 2023-10-07 PROBLEM — E66.01 MORBID OBESITY WITH BMI OF 50.0-59.9, ADULT (MULTI): Status: ACTIVE | Noted: 2023-10-07

## 2023-10-07 RX ORDER — CONTAINER,EMPTY
1 EACH MISCELLANEOUS
COMMUNITY
Start: 2023-09-28 | End: 2023-11-20 | Stop reason: ALTCHOICE

## 2023-10-07 RX ORDER — LEVONORGESTREL 52 MG/1
1 INTRAUTERINE DEVICE INTRAUTERINE ONCE
COMMUNITY

## 2023-10-07 RX ORDER — ONDANSETRON 4 MG/1
1-2 TABLET, ORALLY DISINTEGRATING ORAL
COMMUNITY
Start: 2023-09-24

## 2023-10-07 RX ORDER — ROSUVASTATIN CALCIUM 40 MG/1
40 TABLET, COATED ORAL DAILY
COMMUNITY
Start: 2023-08-06

## 2023-10-07 RX ORDER — HYDROCHLOROTHIAZIDE 25 MG/1
25 TABLET ORAL DAILY
COMMUNITY

## 2023-10-07 RX ORDER — SERTRALINE HYDROCHLORIDE 50 MG/1
50 TABLET, FILM COATED ORAL DAILY
COMMUNITY

## 2023-10-07 RX ORDER — ENOXAPARIN SODIUM 100 MG/ML
0.6 INJECTION SUBCUTANEOUS
COMMUNITY
Start: 2023-09-28 | End: 2023-11-20 | Stop reason: ALTCHOICE

## 2023-10-07 RX ORDER — LEVOTHYROXINE SODIUM 50 UG/1
50 TABLET ORAL DAILY
COMMUNITY

## 2023-10-07 RX ORDER — ISOPROPYL ALCOHOL 70 ML/100ML
SWAB TOPICAL
COMMUNITY
Start: 2023-09-28 | End: 2024-01-08 | Stop reason: WASHOUT

## 2023-10-07 RX ORDER — OMEPRAZOLE 40 MG/1
CAPSULE, DELAYED RELEASE ORAL
COMMUNITY
Start: 2023-09-19

## 2023-10-09 ENCOUNTER — CLINICAL SUPPORT (OUTPATIENT)
Dept: PREADMISSION TESTING | Facility: HOSPITAL | Age: 43
End: 2023-10-09
Payer: COMMERCIAL

## 2023-10-09 ENCOUNTER — HOSPITAL ENCOUNTER (OUTPATIENT)
Dept: CARDIOLOGY | Facility: HOSPITAL | Age: 43
Discharge: HOME | End: 2023-10-09
Payer: COMMERCIAL

## 2023-10-09 DIAGNOSIS — Z01.818 PREOP TESTING: Primary | ICD-10-CM

## 2023-10-09 DIAGNOSIS — Z01.818 PREOP TESTING: ICD-10-CM

## 2023-10-09 LAB
ABO GROUP (TYPE) IN BLOOD: NORMAL
ALBUMIN SERPL BCP-MCNC: 4.2 G/DL (ref 3.4–5)
ALP SERPL-CCNC: 62 U/L (ref 33–110)
ALT SERPL W P-5'-P-CCNC: 21 U/L (ref 7–45)
ANION GAP SERPL CALC-SCNC: 10 MMOL/L (ref 10–20)
ANTIBODY SCREEN: NORMAL
APPEARANCE UR: ABNORMAL
AST SERPL W P-5'-P-CCNC: 13 U/L (ref 9–39)
BACTERIA #/AREA URNS AUTO: ABNORMAL /HPF
BASOPHILS # BLD AUTO: 0.04 X10*3/UL (ref 0–0.1)
BASOPHILS NFR BLD AUTO: 0.6 %
BILIRUB SERPL-MCNC: 0.5 MG/DL (ref 0–1.2)
BILIRUB UR STRIP.AUTO-MCNC: NEGATIVE MG/DL
BUN SERPL-MCNC: 27 MG/DL (ref 6–23)
CALCIUM SERPL-MCNC: 9.4 MG/DL (ref 8.6–10.3)
CHLORIDE SERPL-SCNC: 100 MMOL/L (ref 98–107)
CO2 SERPL-SCNC: 33 MMOL/L (ref 21–32)
COLOR UR: YELLOW
CREAT SERPL-MCNC: 0.63 MG/DL (ref 0.5–1.05)
EOSINOPHIL # BLD AUTO: 0.16 X10*3/UL (ref 0–0.7)
EOSINOPHIL NFR BLD AUTO: 2.2 %
ERYTHROCYTE [DISTWIDTH] IN BLOOD BY AUTOMATED COUNT: 14 % (ref 11.5–14.5)
GFR SERPL CREATININE-BSD FRML MDRD: >90 ML/MIN/1.73M*2
GLUCOSE SERPL-MCNC: 98 MG/DL (ref 74–99)
GLUCOSE UR STRIP.AUTO-MCNC: NEGATIVE MG/DL
HCT VFR BLD AUTO: 38.4 % (ref 36–46)
HGB BLD-MCNC: 12.7 G/DL (ref 12–16)
IMM GRANULOCYTES # BLD AUTO: 0.01 X10*3/UL (ref 0–0.7)
IMM GRANULOCYTES NFR BLD AUTO: 0.1 % (ref 0–0.9)
INR PPP: 1 (ref 0.9–1.1)
KETONES UR STRIP.AUTO-MCNC: NEGATIVE MG/DL
LEUKOCYTE ESTERASE UR QL STRIP.AUTO: ABNORMAL
LYMPHOCYTES # BLD AUTO: 2.31 X10*3/UL (ref 1.2–4.8)
LYMPHOCYTES NFR BLD AUTO: 31.9 %
MCH RBC QN AUTO: 28.4 PG (ref 26–34)
MCHC RBC AUTO-ENTMCNC: 33.1 G/DL (ref 32–36)
MCV RBC AUTO: 86 FL (ref 80–100)
MONOCYTES # BLD AUTO: 0.6 X10*3/UL (ref 0.1–1)
MONOCYTES NFR BLD AUTO: 8.3 %
NEUTROPHILS # BLD AUTO: 4.12 X10*3/UL (ref 1.2–7.7)
NEUTROPHILS NFR BLD AUTO: 56.9 %
NITRITE UR QL STRIP.AUTO: NEGATIVE
NRBC BLD-RTO: 0 /100 WBCS (ref 0–0)
PH UR STRIP.AUTO: 5 [PH]
PLATELET # BLD AUTO: 307 X10*3/UL (ref 150–450)
PMV BLD AUTO: 9.8 FL (ref 7.5–11.5)
POTASSIUM SERPL-SCNC: 3.7 MMOL/L (ref 3.5–5.3)
PROT SERPL-MCNC: 6.9 G/DL (ref 6.4–8.2)
PROT UR STRIP.AUTO-MCNC: NEGATIVE MG/DL
PROTHROMBIN TIME: 11.8 SECONDS (ref 9.8–12.8)
RBC # BLD AUTO: 4.47 X10*6/UL (ref 4–5.2)
RBC # UR STRIP.AUTO: NEGATIVE /UL
RBC #/AREA URNS AUTO: ABNORMAL /HPF
RH FACTOR (ANTIGEN D): NORMAL
SODIUM SERPL-SCNC: 139 MMOL/L (ref 136–145)
SP GR UR STRIP.AUTO: 1.02
SQUAMOUS #/AREA URNS AUTO: ABNORMAL /HPF
URATE CRY #/AREA UR COMP ASSIST: ABNORMAL /HPF
UROBILINOGEN UR STRIP.AUTO-MCNC: <2 MG/DL
WBC # BLD AUTO: 7.2 X10*3/UL (ref 4.4–11.3)
WBC #/AREA URNS AUTO: ABNORMAL /HPF

## 2023-10-09 PROCEDURE — 87086 URINE CULTURE/COLONY COUNT: CPT | Mod: CMCLAB,PARLAB

## 2023-10-09 PROCEDURE — 81001 URINALYSIS AUTO W/SCOPE: CPT

## 2023-10-09 PROCEDURE — 93010 ELECTROCARDIOGRAM REPORT: CPT | Performed by: INTERNAL MEDICINE

## 2023-10-09 PROCEDURE — 93005 ELECTROCARDIOGRAM TRACING: CPT

## 2023-10-09 PROCEDURE — 86901 BLOOD TYPING SEROLOGIC RH(D): CPT

## 2023-10-09 PROCEDURE — 85025 COMPLETE CBC W/AUTO DIFF WBC: CPT

## 2023-10-09 PROCEDURE — 36415 COLL VENOUS BLD VENIPUNCTURE: CPT

## 2023-10-09 PROCEDURE — 85610 PROTHROMBIN TIME: CPT

## 2023-10-09 PROCEDURE — 80053 COMPREHEN METABOLIC PANEL: CPT

## 2023-10-09 NOTE — PREPROCEDURE INSTRUCTIONS
Current Medications   Medication Instructions    hydroCHLOROthiazide (HYDRODiuril) 25 mg tablet Continue until night before surgery    levothyroxine (Synthroid, Levoxyl) 50 mcg tablet Take morning of surgery with sip of water, no other fluids    rosuvastatin (Crestor) 40 mg tablet Take morning of surgery with sip of water, no other fluids    sertraline (Zoloft) 50 mg tablet Take morning of surgery with sip of water, no other fluids       *One of our staff members will call you ONE business day before your surgery, between 11a-2p  We will let you know the time to arrive.  If you have no received a call by 2 pm, call [phone]    *When you arrive at the hospital-->GO TO Registration on the ground floor    *You will need a responsible adult to drive you home    HOME PREOPERATIVE ANTIBACTERIAL SHOWER:  -This shower is a way of cleaning the skin with germ killing solution before surgery.  The solution contains Chorhexidine (CHG).   Let your Dr. Know if you are allergic to Chlorhexidine.    NIGHT BEFORE SURGERY:  IF you are having a TOTAL joint replacement- YOU WILL SHOWER 2 NIGHTS PRIOR to surgery    -Take a normal shower and wash your hair  -Turn OFF water to avoid rinsing the antibacterial skin cleanser off (CHG).  -Apply the CHG cleanser to a clean and wet washcloth.  Lather your entire body from the neck down.    -DO NOT USE ON THE HEAD, FACE, or GENITALS.  -RINSE immediately if CHG is in contact with your eyes, ears or mouth  -Gently wash your body.  Have the CHG cleanser stay on your skin for 3 MINUTES.  -After 3 minutes, turn on water and rinse the CHG cleanser off your body completely  -DO NOT WASH with regular soap after using CHG.  -PAT DRY with a clean fresh towel  -DO NOT apply any neely, deodorants or lotions  -Dress in clean night cloths  **CHG cleanser will cause stains to fabrics if you wash them with bleach after use.     DAY OF SURGERY:  -Take shower-->JUST GET WET.  Turn OFF water.  -REPEAT the CHG  cleanse as you did the night before.  -PAT DRY-->  -Put on loose,  comfortable, clean clothing, that will accommodate bandages    -No acrylic nails or nail polish on at least one fingernail, NO polish on toes for foot surgery  -You may be asked to remove your dentures, partial plate, eyeglasses or contact lenses before going to surgery.  Please bring a case for these items.  -Body piercings need to be removed.  Jewelry and valuables should be left at home.    What you may be asked to bring to surgery:  ___Crutches, walker  ___CPAP machine  ___Urine specimen                     NPO Instructions:    Do not eat any food after midnight the night before your surgery/procedure.    Additional Instructions:     Review your medication instructions, stop indicated medications  You will be contacted regarding the time of your arrival to facility and surgery time  Do not eat any food after Midnight

## 2023-10-10 ENCOUNTER — PREP FOR PROCEDURE (OUTPATIENT)
Dept: SURGERY | Facility: HOSPITAL | Age: 43
End: 2023-10-10

## 2023-10-10 DIAGNOSIS — E66.9 OBESITY: Primary | ICD-10-CM

## 2023-10-10 RX ORDER — HEPARIN SODIUM 5000 [USP'U]/ML
5000 INJECTION, SOLUTION INTRAVENOUS; SUBCUTANEOUS ONCE
Status: CANCELLED | OUTPATIENT
Start: 2023-10-10 | End: 2023-10-10

## 2023-10-10 RX ORDER — METRONIDAZOLE 500 MG/100ML
500 INJECTION, SOLUTION INTRAVENOUS EVERY 8 HOURS
Status: CANCELLED | OUTPATIENT
Start: 2023-10-11 | End: 2023-10-11

## 2023-10-10 RX ORDER — SCOLOPAMINE TRANSDERMAL SYSTEM 1 MG/1
1 PATCH, EXTENDED RELEASE TRANSDERMAL ONCE
Status: CANCELLED | OUTPATIENT
Start: 2023-10-10 | End: 2023-10-10

## 2023-10-11 ENCOUNTER — ANESTHESIA EVENT (OUTPATIENT)
Dept: OPERATING ROOM | Facility: HOSPITAL | Age: 43
DRG: 620 | End: 2023-10-11
Payer: COMMERCIAL

## 2023-10-11 ENCOUNTER — HOSPITAL ENCOUNTER (INPATIENT)
Facility: HOSPITAL | Age: 43
LOS: 5 days | Discharge: HOME | DRG: 620 | End: 2023-10-16
Attending: SURGERY | Admitting: SURGERY
Payer: COMMERCIAL

## 2023-10-11 ENCOUNTER — ANESTHESIA (OUTPATIENT)
Dept: OPERATING ROOM | Facility: HOSPITAL | Age: 43
DRG: 620 | End: 2023-10-11
Payer: COMMERCIAL

## 2023-10-11 DIAGNOSIS — Z98.84 BARIATRIC SURGERY STATUS: ICD-10-CM

## 2023-10-11 DIAGNOSIS — E66.01 CLASS 3 SEVERE OBESITY DUE TO EXCESS CALORIES WITH SERIOUS COMORBIDITY IN ADULT, UNSPECIFIED BMI (MULTI): ICD-10-CM

## 2023-10-11 DIAGNOSIS — E66.01 MORBID (SEVERE) OBESITY DUE TO EXCESS CALORIES (MULTI): ICD-10-CM

## 2023-10-11 DIAGNOSIS — E66.9 OBESITY: Primary | ICD-10-CM

## 2023-10-11 DIAGNOSIS — E66.01 CLASS 3 SEVERE OBESITY DUE TO EXCESS CALORIES WITH BODY MASS INDEX (BMI) OF 40.0 TO 44.9 IN ADULT, UNSPECIFIED WHETHER SERIOUS COMORBIDITY PRESENT (MULTI): ICD-10-CM

## 2023-10-11 DIAGNOSIS — R60.0 LOCALIZED EDEMA: ICD-10-CM

## 2023-10-11 LAB
ABO GROUP (TYPE) IN BLOOD: NORMAL
BACTERIA UR CULT: NORMAL
PREGNANCY TEST URINE, POC: NEGATIVE
RH FACTOR (ANTIGEN D): NORMAL

## 2023-10-11 PROCEDURE — 2500000005 HC RX 250 GENERAL PHARMACY W/O HCPCS

## 2023-10-11 PROCEDURE — 96372 THER/PROPH/DIAG INJ SC/IM: CPT | Performed by: STUDENT IN AN ORGANIZED HEALTH CARE EDUCATION/TRAINING PROGRAM

## 2023-10-11 PROCEDURE — 96372 THER/PROPH/DIAG INJ SC/IM: CPT

## 2023-10-11 PROCEDURE — 2780000003 HC OR 278 NO HCPCS: Performed by: SURGERY

## 2023-10-11 PROCEDURE — 43644 LAP GASTRIC BYPASS/ROUX-EN-Y: CPT | Performed by: STUDENT IN AN ORGANIZED HEALTH CARE EDUCATION/TRAINING PROGRAM

## 2023-10-11 PROCEDURE — 2580000001 HC RX 258 IV SOLUTIONS

## 2023-10-11 PROCEDURE — A43644 PR LAP GASTRIC BYPASS/ROUX-EN-Y: Performed by: ANESTHESIOLOGY

## 2023-10-11 PROCEDURE — 0D164ZA BYPASS STOMACH TO JEJUNUM, PERCUTANEOUS ENDOSCOPIC APPROACH: ICD-10-PCS | Performed by: SURGERY

## 2023-10-11 PROCEDURE — 36415 COLL VENOUS BLD VENIPUNCTURE: CPT | Performed by: SURGERY

## 2023-10-11 PROCEDURE — 2500000004 HC RX 250 GENERAL PHARMACY W/ HCPCS (ALT 636 FOR OP/ED)

## 2023-10-11 PROCEDURE — 7100000001 HC RECOVERY ROOM TIME - INITIAL BASE CHARGE: Performed by: SURGERY

## 2023-10-11 PROCEDURE — 3700000001 HC GENERAL ANESTHESIA TIME - INITIAL BASE CHARGE: Performed by: SURGERY

## 2023-10-11 PROCEDURE — 2500000004 HC RX 250 GENERAL PHARMACY W/ HCPCS (ALT 636 FOR OP/ED): Performed by: STUDENT IN AN ORGANIZED HEALTH CARE EDUCATION/TRAINING PROGRAM

## 2023-10-11 PROCEDURE — 2720000007 HC OR 272 NO HCPCS: Performed by: SURGERY

## 2023-10-11 PROCEDURE — 2500000004 HC RX 250 GENERAL PHARMACY W/ HCPCS (ALT 636 FOR OP/ED): Performed by: NURSE ANESTHETIST, CERTIFIED REGISTERED

## 2023-10-11 PROCEDURE — 2500000004 HC RX 250 GENERAL PHARMACY W/ HCPCS (ALT 636 FOR OP/ED): Performed by: SURGERY

## 2023-10-11 PROCEDURE — 3700000002 HC GENERAL ANESTHESIA TIME - EACH INCREMENTAL 1 MINUTE: Performed by: SURGERY

## 2023-10-11 PROCEDURE — 3600000009 HC OR TIME - EACH INCREMENTAL 1 MINUTE - PROCEDURE LEVEL FOUR: Performed by: SURGERY

## 2023-10-11 PROCEDURE — 2500000004 HC RX 250 GENERAL PHARMACY W/ HCPCS (ALT 636 FOR OP/ED): Performed by: ANESTHESIOLOGY

## 2023-10-11 PROCEDURE — 1100000001 HC PRIVATE ROOM DAILY

## 2023-10-11 PROCEDURE — A4217 STERILE WATER/SALINE, 500 ML: HCPCS | Performed by: SURGERY

## 2023-10-11 PROCEDURE — 0DJ08ZZ INSPECTION OF UPPER INTESTINAL TRACT, VIA NATURAL OR ARTIFICIAL OPENING ENDOSCOPIC: ICD-10-PCS | Performed by: SURGERY

## 2023-10-11 PROCEDURE — 43644 LAP GASTRIC BYPASS/ROUX-EN-Y: CPT | Performed by: SURGERY

## 2023-10-11 PROCEDURE — A43644 PR LAP GASTRIC BYPASS/ROUX-EN-Y: Performed by: NURSE ANESTHETIST, CERTIFIED REGISTERED

## 2023-10-11 PROCEDURE — 3600000004 HC OR TIME - INITIAL BASE CHARGE - PROCEDURE LEVEL FOUR: Performed by: SURGERY

## 2023-10-11 PROCEDURE — 7100000002 HC RECOVERY ROOM TIME - EACH INCREMENTAL 1 MINUTE: Performed by: SURGERY

## 2023-10-11 PROCEDURE — 2500000001 HC RX 250 WO HCPCS SELF ADMINISTERED DRUGS (ALT 637 FOR MEDICARE OP): Performed by: STUDENT IN AN ORGANIZED HEALTH CARE EDUCATION/TRAINING PROGRAM

## 2023-10-11 PROCEDURE — 43235 EGD DIAGNOSTIC BRUSH WASH: CPT | Performed by: SURGERY

## 2023-10-11 RX ORDER — SODIUM CHLORIDE, SODIUM LACTATE, POTASSIUM CHLORIDE, CALCIUM CHLORIDE 600; 310; 30; 20 MG/100ML; MG/100ML; MG/100ML; MG/100ML
150 INJECTION, SOLUTION INTRAVENOUS CONTINUOUS
Status: DISCONTINUED | OUTPATIENT
Start: 2023-10-11 | End: 2023-10-15

## 2023-10-11 RX ORDER — ACETAMINOPHEN 160 MG/5ML
650 SOLUTION ORAL EVERY 4 HOURS PRN
Status: DISCONTINUED | OUTPATIENT
Start: 2023-10-11 | End: 2023-10-16 | Stop reason: HOSPADM

## 2023-10-11 RX ORDER — SODIUM CHLORIDE, SODIUM LACTATE, POTASSIUM CHLORIDE, CALCIUM CHLORIDE 600; 310; 30; 20 MG/100ML; MG/100ML; MG/100ML; MG/100ML
100 INJECTION, SOLUTION INTRAVENOUS CONTINUOUS
Status: CANCELLED | OUTPATIENT
Start: 2023-10-11

## 2023-10-11 RX ORDER — HYDROMORPHONE HYDROCHLORIDE 2 MG/ML
INJECTION, SOLUTION INTRAMUSCULAR; INTRAVENOUS; SUBCUTANEOUS AS NEEDED
Status: DISCONTINUED | OUTPATIENT
Start: 2023-10-11 | End: 2023-10-11

## 2023-10-11 RX ORDER — BUPIVACAINE HYDROCHLORIDE 2.5 MG/ML
INJECTION, SOLUTION EPIDURAL; INFILTRATION; INTRACAUDAL AS NEEDED
Status: DISCONTINUED | OUTPATIENT
Start: 2023-10-11 | End: 2023-10-11 | Stop reason: HOSPADM

## 2023-10-11 RX ORDER — METRONIDAZOLE 500 MG/100ML
500 INJECTION, SOLUTION INTRAVENOUS EVERY 8 HOURS
Status: COMPLETED | OUTPATIENT
Start: 2023-10-11 | End: 2023-10-12

## 2023-10-11 RX ORDER — SCOLOPAMINE TRANSDERMAL SYSTEM 1 MG/1
1 PATCH, EXTENDED RELEASE TRANSDERMAL ONCE
Status: DISCONTINUED | OUTPATIENT
Start: 2023-10-11 | End: 2023-10-11

## 2023-10-11 RX ORDER — KETOROLAC TROMETHAMINE 30 MG/ML
30 INJECTION, SOLUTION INTRAMUSCULAR; INTRAVENOUS EVERY 6 HOURS SCHEDULED
Status: COMPLETED | OUTPATIENT
Start: 2023-10-11 | End: 2023-10-12

## 2023-10-11 RX ORDER — NALOXONE HYDROCHLORIDE 1 MG/ML
0.2 INJECTION INTRAMUSCULAR; INTRAVENOUS; SUBCUTANEOUS EVERY 5 MIN PRN
Status: DISCONTINUED | OUTPATIENT
Start: 2023-10-11 | End: 2023-10-16 | Stop reason: HOSPADM

## 2023-10-11 RX ORDER — SIMETHICONE 80 MG
80 TABLET,CHEWABLE ORAL EVERY 4 HOURS PRN
Status: DISCONTINUED | OUTPATIENT
Start: 2023-10-11 | End: 2023-10-16 | Stop reason: HOSPADM

## 2023-10-11 RX ORDER — OXYCODONE HCL 5 MG/5 ML
5 SOLUTION, ORAL ORAL EVERY 6 HOURS PRN
Status: DISCONTINUED | OUTPATIENT
Start: 2023-10-11 | End: 2023-10-16 | Stop reason: HOSPADM

## 2023-10-11 RX ORDER — CLONIDINE 100 UG/ML
INJECTION, SOLUTION EPIDURAL AS NEEDED
Status: DISCONTINUED | OUTPATIENT
Start: 2023-10-11 | End: 2023-10-11 | Stop reason: HOSPADM

## 2023-10-11 RX ORDER — METRONIDAZOLE 500 MG/100ML
500 INJECTION, SOLUTION INTRAVENOUS EVERY 8 HOURS
Status: COMPLETED | OUTPATIENT
Start: 2023-10-11 | End: 2023-10-11

## 2023-10-11 RX ORDER — HYDROMORPHONE HYDROCHLORIDE 1 MG/ML
1 INJECTION, SOLUTION INTRAMUSCULAR; INTRAVENOUS; SUBCUTANEOUS EVERY 5 MIN PRN
Status: DISCONTINUED | OUTPATIENT
Start: 2023-10-11 | End: 2023-10-11 | Stop reason: HOSPADM

## 2023-10-11 RX ORDER — SODIUM CHLORIDE, SODIUM LACTATE, POTASSIUM CHLORIDE, CALCIUM CHLORIDE 600; 310; 30; 20 MG/100ML; MG/100ML; MG/100ML; MG/100ML
100 INJECTION, SOLUTION INTRAVENOUS CONTINUOUS
Status: DISCONTINUED | OUTPATIENT
Start: 2023-10-11 | End: 2023-10-11 | Stop reason: HOSPADM

## 2023-10-11 RX ORDER — HYDROMORPHONE HYDROCHLORIDE 1 MG/ML
INJECTION, SOLUTION INTRAMUSCULAR; INTRAVENOUS; SUBCUTANEOUS
Status: DISPENSED
Start: 2023-10-11 | End: 2023-10-12

## 2023-10-11 RX ORDER — HEPARIN SODIUM 5000 [USP'U]/ML
INJECTION, SOLUTION INTRAVENOUS; SUBCUTANEOUS
Status: COMPLETED
Start: 2023-10-11 | End: 2023-10-11

## 2023-10-11 RX ORDER — SCOLOPAMINE TRANSDERMAL SYSTEM 1 MG/1
PATCH, EXTENDED RELEASE TRANSDERMAL
Status: COMPLETED
Start: 2023-10-11 | End: 2023-10-14

## 2023-10-11 RX ORDER — MEPERIDINE HYDROCHLORIDE 25 MG/ML
12.5 INJECTION INTRAMUSCULAR; INTRAVENOUS; SUBCUTANEOUS EVERY 10 MIN PRN
Status: CANCELLED | OUTPATIENT
Start: 2023-10-11

## 2023-10-11 RX ORDER — ALBUTEROL SULFATE 0.83 MG/ML
2.5 SOLUTION RESPIRATORY (INHALATION) ONCE AS NEEDED
Status: CANCELLED | OUTPATIENT
Start: 2023-10-11

## 2023-10-11 RX ORDER — WATER 1 ML/ML
IRRIGANT IRRIGATION AS NEEDED
Status: DISCONTINUED | OUTPATIENT
Start: 2023-10-11 | End: 2023-10-11 | Stop reason: HOSPADM

## 2023-10-11 RX ORDER — ACETAMINOPHEN 325 MG/1
975 TABLET ORAL ONCE
Status: CANCELLED | OUTPATIENT
Start: 2023-10-11 | End: 2023-10-11

## 2023-10-11 RX ORDER — PANTOPRAZOLE SODIUM 40 MG/1
40 TABLET, DELAYED RELEASE ORAL
Status: DISCONTINUED | OUTPATIENT
Start: 2023-10-12 | End: 2023-10-16 | Stop reason: HOSPADM

## 2023-10-11 RX ORDER — ROCURONIUM BROMIDE 10 MG/ML
INJECTION, SOLUTION INTRAVENOUS AS NEEDED
Status: DISCONTINUED | OUTPATIENT
Start: 2023-10-11 | End: 2023-10-11

## 2023-10-11 RX ORDER — FENTANYL CITRATE 50 UG/ML
INJECTION, SOLUTION INTRAMUSCULAR; INTRAVENOUS AS NEEDED
Status: DISCONTINUED | OUTPATIENT
Start: 2023-10-11 | End: 2023-10-11

## 2023-10-11 RX ORDER — MIDAZOLAM HYDROCHLORIDE 1 MG/ML
INJECTION, SOLUTION INTRAMUSCULAR; INTRAVENOUS AS NEEDED
Status: DISCONTINUED | OUTPATIENT
Start: 2023-10-11 | End: 2023-10-11

## 2023-10-11 RX ORDER — ONDANSETRON HYDROCHLORIDE 2 MG/ML
4 INJECTION, SOLUTION INTRAVENOUS ONCE AS NEEDED
Status: CANCELLED | OUTPATIENT
Start: 2023-10-11

## 2023-10-11 RX ORDER — OXYCODONE HCL 5 MG/5 ML
10 SOLUTION, ORAL ORAL EVERY 6 HOURS PRN
Status: DISCONTINUED | OUTPATIENT
Start: 2023-10-11 | End: 2023-10-16 | Stop reason: HOSPADM

## 2023-10-11 RX ORDER — HEPARIN SODIUM 5000 [USP'U]/ML
5000 INJECTION, SOLUTION INTRAVENOUS; SUBCUTANEOUS EVERY 8 HOURS SCHEDULED
Status: DISCONTINUED | OUTPATIENT
Start: 2023-10-11 | End: 2023-10-16 | Stop reason: HOSPADM

## 2023-10-11 RX ORDER — DEXAMETHASONE SODIUM PHOSPHATE 4 MG/ML
INJECTION, SOLUTION INTRA-ARTICULAR; INTRALESIONAL; INTRAMUSCULAR; INTRAVENOUS; SOFT TISSUE AS NEEDED
Status: DISCONTINUED | OUTPATIENT
Start: 2023-10-11 | End: 2023-10-11

## 2023-10-11 RX ORDER — MEPERIDINE HYDROCHLORIDE 25 MG/ML
12.5 INJECTION INTRAMUSCULAR; INTRAVENOUS; SUBCUTANEOUS EVERY 10 MIN PRN
Status: DISCONTINUED | OUTPATIENT
Start: 2023-10-11 | End: 2023-10-11 | Stop reason: HOSPADM

## 2023-10-11 RX ORDER — PANTOPRAZOLE SODIUM 40 MG/10ML
40 INJECTION, POWDER, LYOPHILIZED, FOR SOLUTION INTRAVENOUS
Status: DISCONTINUED | OUTPATIENT
Start: 2023-10-12 | End: 2023-10-16 | Stop reason: HOSPADM

## 2023-10-11 RX ORDER — ONDANSETRON HYDROCHLORIDE 2 MG/ML
INJECTION, SOLUTION INTRAVENOUS AS NEEDED
Status: DISCONTINUED | OUTPATIENT
Start: 2023-10-11 | End: 2023-10-11

## 2023-10-11 RX ORDER — IPRATROPIUM BROMIDE 0.5 MG/2.5ML
500 SOLUTION RESPIRATORY (INHALATION) ONCE
Status: CANCELLED | OUTPATIENT
Start: 2023-10-11 | End: 2023-10-11

## 2023-10-11 RX ORDER — HEPARIN SODIUM 5000 [USP'U]/ML
5000 INJECTION, SOLUTION INTRAVENOUS; SUBCUTANEOUS ONCE
Status: COMPLETED | OUTPATIENT
Start: 2023-10-11 | End: 2023-10-11

## 2023-10-11 RX ORDER — DIPHENHYDRAMINE HYDROCHLORIDE 50 MG/ML
12.5 INJECTION INTRAMUSCULAR; INTRAVENOUS ONCE AS NEEDED
Status: CANCELLED | OUTPATIENT
Start: 2023-10-11

## 2023-10-11 RX ORDER — LIDOCAINE HYDROCHLORIDE 10 MG/ML
0.1 INJECTION, SOLUTION EPIDURAL; INFILTRATION; INTRACAUDAL; PERINEURAL ONCE
Status: DISCONTINUED | OUTPATIENT
Start: 2023-10-11 | End: 2023-10-11 | Stop reason: HOSPADM

## 2023-10-11 RX ORDER — SCOLOPAMINE TRANSDERMAL SYSTEM 1 MG/1
1 PATCH, EXTENDED RELEASE TRANSDERMAL ONCE
Status: CANCELLED | OUTPATIENT
Start: 2023-10-11 | End: 2023-10-11

## 2023-10-11 RX ORDER — LIDOCAINE HYDROCHLORIDE 20 MG/ML
INJECTION, SOLUTION INFILTRATION; PERINEURAL AS NEEDED
Status: DISCONTINUED | OUTPATIENT
Start: 2023-10-11 | End: 2023-10-11

## 2023-10-11 RX ORDER — PROPOFOL 10 MG/ML
INJECTION, EMULSION INTRAVENOUS AS NEEDED
Status: DISCONTINUED | OUTPATIENT
Start: 2023-10-11 | End: 2023-10-11

## 2023-10-11 RX ORDER — SODIUM CHLORIDE 0.9 G/100ML
IRRIGANT IRRIGATION AS NEEDED
Status: DISCONTINUED | OUTPATIENT
Start: 2023-10-11 | End: 2023-10-11 | Stop reason: HOSPADM

## 2023-10-11 RX ORDER — LIDOCAINE HYDROCHLORIDE 10 MG/ML
0.1 INJECTION, SOLUTION EPIDURAL; INFILTRATION; INTRACAUDAL; PERINEURAL ONCE
Status: CANCELLED | OUTPATIENT
Start: 2023-10-11 | End: 2023-10-11

## 2023-10-11 RX ORDER — ESOMEPRAZOLE MAGNESIUM 40 MG/1
40 GRANULE, DELAYED RELEASE ORAL
Status: DISCONTINUED | OUTPATIENT
Start: 2023-10-12 | End: 2023-10-16 | Stop reason: HOSPADM

## 2023-10-11 RX ADMIN — ONDANSETRON 4 MG: 2 INJECTION INTRAMUSCULAR; INTRAVENOUS at 16:00

## 2023-10-11 RX ADMIN — FENTANYL CITRATE 100 MCG: 50 INJECTION, SOLUTION INTRAMUSCULAR; INTRAVENOUS at 13:39

## 2023-10-11 RX ADMIN — KETOROLAC TROMETHAMINE 30 MG: 30 INJECTION, SOLUTION INTRAMUSCULAR; INTRAVENOUS at 18:39

## 2023-10-11 RX ADMIN — SIMETHICONE 80 MG: 80 TABLET, CHEWABLE ORAL at 18:42

## 2023-10-11 RX ADMIN — SODIUM CHLORIDE, SODIUM LACTATE, POTASSIUM CHLORIDE, AND CALCIUM CHLORIDE: .6; .31; .03; .02 INJECTION, SOLUTION INTRAVENOUS at 13:30

## 2023-10-11 RX ADMIN — HYDROMORPHONE HYDROCHLORIDE 0.5 MG: 2 INJECTION, SOLUTION INTRAMUSCULAR; INTRAVENOUS; SUBCUTANEOUS at 16:31

## 2023-10-11 RX ADMIN — ROCURONIUM BROMIDE 70 MG: 10 INJECTION, SOLUTION INTRAVENOUS at 13:40

## 2023-10-11 RX ADMIN — OXYCODONE HYDROCHLORIDE 10 MG: 5 SOLUTION ORAL at 21:08

## 2023-10-11 RX ADMIN — FENTANYL CITRATE 50 MCG: 50 INJECTION, SOLUTION INTRAMUSCULAR; INTRAVENOUS at 14:02

## 2023-10-11 RX ADMIN — SIMETHICONE 80 MG: 80 TABLET, CHEWABLE ORAL at 23:05

## 2023-10-11 RX ADMIN — HYDROMORPHONE HYDROCHLORIDE 0.5 MG: 2 INJECTION, SOLUTION INTRAMUSCULAR; INTRAVENOUS; SUBCUTANEOUS at 16:33

## 2023-10-11 RX ADMIN — PROPOFOL 200 MG: 10 INJECTION, EMULSION INTRAVENOUS at 13:39

## 2023-10-11 RX ADMIN — ROCURONIUM BROMIDE 20 MG: 10 INJECTION, SOLUTION INTRAVENOUS at 15:33

## 2023-10-11 RX ADMIN — DEXAMETHASONE SODIUM PHOSPHATE 8 MG: 4 INJECTION, SOLUTION INTRAMUSCULAR; INTRAVENOUS at 13:46

## 2023-10-11 RX ADMIN — MIDAZOLAM 2 MG: 1 INJECTION INTRAMUSCULAR; INTRAVENOUS at 13:32

## 2023-10-11 RX ADMIN — HEPARIN SODIUM 5000 UNITS: 5000 INJECTION INTRAVENOUS; SUBCUTANEOUS at 11:26

## 2023-10-11 RX ADMIN — SODIUM CHLORIDE, SODIUM LACTATE, POTASSIUM CHLORIDE, AND CALCIUM CHLORIDE: .6; .31; .03; .02 INJECTION, SOLUTION INTRAVENOUS at 16:13

## 2023-10-11 RX ADMIN — DEXTROSE MONOHYDRATE 3 G: 5 INJECTION INTRAVENOUS at 13:34

## 2023-10-11 RX ADMIN — FENTANYL CITRATE 50 MCG: 50 INJECTION, SOLUTION INTRAMUSCULAR; INTRAVENOUS at 14:33

## 2023-10-11 RX ADMIN — ROCURONIUM BROMIDE 30 MG: 10 INJECTION, SOLUTION INTRAVENOUS at 14:32

## 2023-10-11 RX ADMIN — LIDOCAINE HYDROCHLORIDE 100 MG: 20 INJECTION, SOLUTION INFILTRATION; PERINEURAL at 13:39

## 2023-10-11 RX ADMIN — HYDROMORPHONE HYDROCHLORIDE 0.5 MG: 1 INJECTION, SOLUTION INTRAMUSCULAR; INTRAVENOUS; SUBCUTANEOUS at 17:07

## 2023-10-11 RX ADMIN — SCOPALAMINE 1 PATCH: 1 PATCH, EXTENDED RELEASE TRANSDERMAL at 11:25

## 2023-10-11 RX ADMIN — SCOLOPAMINE TRANSDERMAL SYSTEM 1 PATCH: 1 PATCH, EXTENDED RELEASE TRANSDERMAL at 11:25

## 2023-10-11 RX ADMIN — HEPARIN SODIUM 5000 UNITS: 5000 INJECTION, SOLUTION INTRAVENOUS; SUBCUTANEOUS at 11:26

## 2023-10-11 RX ADMIN — METRONIDAZOLE 500 MG: 500 INJECTION, SOLUTION INTRAVENOUS at 13:45

## 2023-10-11 RX ADMIN — HYDROMORPHONE HYDROCHLORIDE 0.2 MG: 1 INJECTION, SOLUTION INTRAMUSCULAR; INTRAVENOUS; SUBCUTANEOUS at 22:49

## 2023-10-11 RX ADMIN — METRONIDAZOLE 500 MG: 500 INJECTION, SOLUTION INTRAVENOUS at 18:39

## 2023-10-11 RX ADMIN — HEPARIN SODIUM 5000 UNITS: 5000 INJECTION INTRAVENOUS; SUBCUTANEOUS at 21:04

## 2023-10-11 SDOH — SOCIAL STABILITY: SOCIAL INSECURITY: ARE THERE ANY APPARENT SIGNS OF INJURIES/BEHAVIORS THAT COULD BE RELATED TO ABUSE/NEGLECT?: NO

## 2023-10-11 SDOH — SOCIAL STABILITY: SOCIAL INSECURITY: DOES ANYONE TRY TO KEEP YOU FROM HAVING/CONTACTING OTHER FRIENDS OR DOING THINGS OUTSIDE YOUR HOME?: NO

## 2023-10-11 SDOH — SOCIAL STABILITY: SOCIAL INSECURITY: HAS ANYONE EVER THREATENED TO HURT YOUR FAMILY OR YOUR PETS?: NO

## 2023-10-11 SDOH — HEALTH STABILITY: MENTAL HEALTH: CURRENT SMOKER: 0

## 2023-10-11 SDOH — SOCIAL STABILITY: SOCIAL INSECURITY: DO YOU FEEL ANYONE HAS EXPLOITED OR TAKEN ADVANTAGE OF YOU FINANCIALLY OR OF YOUR PERSONAL PROPERTY?: NO

## 2023-10-11 SDOH — SOCIAL STABILITY: SOCIAL INSECURITY: ABUSE: ADULT

## 2023-10-11 SDOH — SOCIAL STABILITY: SOCIAL INSECURITY: DO YOU FEEL UNSAFE GOING BACK TO THE PLACE WHERE YOU ARE LIVING?: NO

## 2023-10-11 SDOH — SOCIAL STABILITY: SOCIAL INSECURITY: ARE YOU OR HAVE YOU BEEN THREATENED OR ABUSED PHYSICALLY, EMOTIONALLY, OR SEXUALLY BY ANYONE?: NO

## 2023-10-11 SDOH — SOCIAL STABILITY: SOCIAL INSECURITY: HAVE YOU HAD THOUGHTS OF HARMING ANYONE ELSE?: NO

## 2023-10-11 SDOH — SOCIAL STABILITY: SOCIAL INSECURITY: WERE YOU ABLE TO COMPLETE ALL THE BEHAVIORAL HEALTH SCREENINGS?: YES

## 2023-10-11 ASSESSMENT — PAIN SCALES - GENERAL
PAIN_LEVEL: 8
PAINLEVEL_OUTOF10: 4
PAINLEVEL_OUTOF10: 6
PAINLEVEL_OUTOF10: 9
PAINLEVEL_OUTOF10: 6
PAINLEVEL_OUTOF10: 10 - WORST POSSIBLE PAIN
PAINLEVEL_OUTOF10: 7
PAINLEVEL_OUTOF10: 0 - NO PAIN
PAINLEVEL_OUTOF10: 7
PAINLEVEL_OUTOF10: 9

## 2023-10-11 ASSESSMENT — COGNITIVE AND FUNCTIONAL STATUS - GENERAL
STANDING UP FROM CHAIR USING ARMS: A LITTLE
MOBILITY SCORE: 18
PATIENT BASELINE BEDBOUND: NO
TURNING FROM BACK TO SIDE WHILE IN FLAT BAD: A LITTLE
MOVING FROM LYING ON BACK TO SITTING ON SIDE OF FLAT BED WITH BEDRAILS: A LITTLE
DAILY ACTIVITIY SCORE: 21
MOVING FROM LYING ON BACK TO SITTING ON SIDE OF FLAT BED WITH BEDRAILS: A LITTLE
WALKING IN HOSPITAL ROOM: A LITTLE
TURNING FROM BACK TO SIDE WHILE IN FLAT BAD: A LITTLE
TOILETING: A LITTLE
TOILETING: A LITTLE
WALKING IN HOSPITAL ROOM: A LITTLE
DRESSING REGULAR LOWER BODY CLOTHING: A LITTLE
DRESSING REGULAR LOWER BODY CLOTHING: A LITTLE
STANDING UP FROM CHAIR USING ARMS: A LITTLE
MOVING TO AND FROM BED TO CHAIR: A LITTLE
CLIMB 3 TO 5 STEPS WITH RAILING: A LITTLE
PATIENT BASELINE BEDBOUND: NO
CLIMB 3 TO 5 STEPS WITH RAILING: A LITTLE
DAILY ACTIVITIY SCORE: 21
HELP NEEDED FOR BATHING: A LITTLE
HELP NEEDED FOR BATHING: A LITTLE

## 2023-10-11 ASSESSMENT — ACTIVITIES OF DAILY LIVING (ADL)
HEARING - LEFT EAR: FUNCTIONAL
ADEQUATE_TO_COMPLETE_ADL: YES
BATHING: NEEDS ASSISTANCE
ADEQUATE_TO_COMPLETE_ADL: YES
LACK_OF_TRANSPORTATION: NO
HEARING - LEFT EAR: FUNCTIONAL
WALKS IN HOME: NEEDS ASSISTANCE
JUDGMENT_ADEQUATE_SAFELY_COMPLETE_DAILY_ACTIVITIES: YES
FEEDING YOURSELF: INDEPENDENT
FEEDING YOURSELF: INDEPENDENT
WALKS IN HOME: NEEDS ASSISTANCE
PATIENT'S MEMORY ADEQUATE TO SAFELY COMPLETE DAILY ACTIVITIES?: YES
HEARING - RIGHT EAR: FUNCTIONAL
HEARING - RIGHT EAR: FUNCTIONAL
JUDGMENT_ADEQUATE_SAFELY_COMPLETE_DAILY_ACTIVITIES: YES
TOILETING: NEEDS ASSISTANCE
DRESSING YOURSELF: NEEDS ASSISTANCE
GROOMING: INDEPENDENT
DRESSING YOURSELF: NEEDS ASSISTANCE
BATHING: NEEDS ASSISTANCE
PATIENT'S MEMORY ADEQUATE TO SAFELY COMPLETE DAILY ACTIVITIES?: YES
GROOMING: INDEPENDENT
TOILETING: NEEDS ASSISTANCE

## 2023-10-11 ASSESSMENT — LIFESTYLE VARIABLES
AUDIT-C TOTAL SCORE: 0
AUDIT-C TOTAL SCORE: 0
HOW MANY STANDARD DRINKS CONTAINING ALCOHOL DO YOU HAVE ON A TYPICAL DAY: PATIENT DOES NOT DRINK
SUBSTANCE_ABUSE_PAST_12_MONTHS: NO
PRESCIPTION_ABUSE_PAST_12_MONTHS: NO
HOW OFTEN DO YOU HAVE 6 OR MORE DRINKS ON ONE OCCASION: NEVER
HOW OFTEN DO YOU HAVE A DRINK CONTAINING ALCOHOL: NEVER
SKIP TO QUESTIONS 9-10: 1

## 2023-10-11 ASSESSMENT — COLUMBIA-SUICIDE SEVERITY RATING SCALE - C-SSRS
6. HAVE YOU EVER DONE ANYTHING, STARTED TO DO ANYTHING, OR PREPARED TO DO ANYTHING TO END YOUR LIFE?: NO
1. IN THE PAST MONTH, HAVE YOU WISHED YOU WERE DEAD OR WISHED YOU COULD GO TO SLEEP AND NOT WAKE UP?: NO
2. HAVE YOU ACTUALLY HAD ANY THOUGHTS OF KILLING YOURSELF?: NO
6. HAVE YOU EVER DONE ANYTHING, STARTED TO DO ANYTHING, OR PREPARED TO DO ANYTHING TO END YOUR LIFE?: NO
1. IN THE PAST MONTH, HAVE YOU WISHED YOU WERE DEAD OR WISHED YOU COULD GO TO SLEEP AND NOT WAKE UP?: NO
2. HAVE YOU ACTUALLY HAD ANY THOUGHTS OF KILLING YOURSELF?: NO

## 2023-10-11 ASSESSMENT — PATIENT HEALTH QUESTIONNAIRE - PHQ9
SUM OF ALL RESPONSES TO PHQ9 QUESTIONS 1 & 2: 0
2. FEELING DOWN, DEPRESSED OR HOPELESS: NOT AT ALL
1. LITTLE INTEREST OR PLEASURE IN DOING THINGS: NOT AT ALL

## 2023-10-11 ASSESSMENT — PAIN - FUNCTIONAL ASSESSMENT
PAIN_FUNCTIONAL_ASSESSMENT: 0-10
PAIN_FUNCTIONAL_ASSESSMENT: 0-10

## 2023-10-11 ASSESSMENT — PAIN DESCRIPTION - DESCRIPTORS: DESCRIPTORS: PRESSURE

## 2023-10-11 NOTE — ANESTHESIA PREPROCEDURE EVALUATION
Patient: Meli Tsang    Procedure Information       Date/Time: 10/11/23 1200    Procedure: ROBOTIC ASSISTED LAPAROSCOPIC GASTRIC BYPASS    Location: [facility] 09 / [facility]    Surgeons: Jacinto Weiss MD            Relevant Problems   Anesthesia   (+) Post-operative nausea and vomiting      Cardiovascular   (+) Essential hypertension   (+) Hypercholesterolemia      Endocrine   (+) Obesity      GI   (+) Chronic GERD      /Renal   (+) Recurrent UTI      Neuro/Psych   (+) Anxiety and depression      Pulmonary   (+) YONAS (obstructive sleep apnea)      GI/Hepatic   (+) Elevated liver function tests      Infectious Disease   (+) Recurrent UTI       Clinical information reviewed:   Tobacco  Allergies   Problems  Med Hx  Surg Hx   Fam Hx  Soc Hx        NPO Detail:  No data recorded     Physical Exam    Airway  Mallampati: III  TM distance: <3 FB  Neck ROM: full     Cardiovascular - normal exam  Rhythm: regular  Rate: normal     Dental - normal exam     Pulmonary - normal exam     Abdominal   (+) obese             Anesthesia Plan    ASA 3     general     The patient is not a current smoker.  Education provided regarding risk of obstructive sleep apnea.  intravenous induction   Postoperative administration of opioids is intended.  Trial extubation is planned.  Anesthetic plan and risks discussed with patient.  Use of blood products discussed with patient who.    Plan discussed with CRNA.

## 2023-10-11 NOTE — OP NOTE
ROBOTIC ASSISTED LAPAROSCOPIC GASTRIC BYPASS / EGD / TAP BLOCK Operative Note     Date: 10/11/2023  OR Location: [facility]    Name: Meli Tsang, : 1980, Age: 43 y.o., MRN: [id number], Sex: female    Diagnosis  Pre-op Diagnosis     * Morbid (severe) obesity due to excess calories (CMS/HCC) [E66.01] Post-op Diagnosis     * Morbid (severe) obesity due to excess calories (CMS/HCC) [E66.01]     Procedures    * ROBOTIC ASSISTED LAPAROSCOPIC GASTRIC BYPASS / EGD / TAP BLOCK    Surgeons      * Jacinto Weiss - Primary    Resident/Fellow/Other Assistant:  Surgical Assistant: Roly Wills    Procedure Summary  Anesthesia: General  ASA: III  Anesthesia Staff: Anesthesiologist: Giorgio Maddox MD; Tyler Davenport MD; Nick Centeno MD  CRNA: Rox Nuñez APRN-CRNA; Elayne Kellogg APRN-CRNA  Estimated Blood Loss: 20mL  Intra-op Medications:   Medication Name Total Dose   sodium chloride 0.9 % irrigation solution 1,000 mL   sterile water irrigation solution 500 mL   bupivacaine PF (Marcaine) 0.25 % (2.5 mg/mL) injection 50 mL   cloNIDine PF (Duraclon) injection 200 mcg   ceFAZolin (Ancef) in dextrose 5% (ADV/MBP) IV 3 g 3 g   metroNIDAZOLE in NaCl (iso-os) (Flagyl)  mg 500 mg              Anesthesia Record               Intraprocedure I/O Totals          Intake    LR 1820.00 mL    Total Intake 1820 mL       Output    NG/OG Tube Output 50 mL    Total Output 50 mL       Net    Net Volume 1770 mL          Specimen: No specimens collected     Staff:   Circulator: Cy Steele RN  Relief Circulator: Marisel Gonzalez RN; Dorita Ayoub RN  Relief Scrub: An Santiago  Scrub Person: Enriqueta Vinson RN         Drains and/or Catheters:   NG/OG/Feeding Tube Orogastric 16 Fr Center mouth (Active)           Findings: normal gastric anatomy    Indications: Meli Tsang is an 43 y.o. female who is having surgery for Morbid (severe) obesity due to excess calories (CMS/HCC) [E66.01].     The patient was  seen in the preoperative area. The risks, benefits, complications, treatment options, non-operative alternatives, expected recovery and outcomes were discussed with the patient. The possibilities of reaction to medication, pulmonary aspiration, injury to surrounding structures, bleeding, recurrent infection, the need for additional procedures, failure to diagnose a condition, and creating a complication requiring transfusion or operation were discussed with the patient. The patient concurred with the proposed plan, giving informed consent.  The site of surgery was properly noted/marked if necessary per policy. The patient has been actively warmed in preoperative area. Preoperative antibiotics have been ordered and given within 1 hours of incision. Venous thrombosis prophylaxis have been ordered including bilateral sequential compression devices and chemical prophylaxis    Procedure Details:   Complications:  None; patient tolerated the procedure well.    Disposition: PACU - hemodynamically stable.  Condition: stable           Attending Attestation:     Jacinto Weiss  Phone Number: [phone]

## 2023-10-11 NOTE — ANESTHESIA POSTPROCEDURE EVALUATION
Patient: Meli Tsang    Procedure Summary       Date: 10/11/23 Room / Location: [facility] 09 / [facility]    Anesthesia Start: 1330 Anesthesia Stop:     Procedure: ROBOTIC ASSISTED LAPAROSCOPIC GASTRIC BYPASS / EGD / TAP BLOCK Diagnosis:       Morbid (severe) obesity due to excess calories (CMS/HCC)      (Morbid (severe) obesity due to excess calories (CMS/HCC) [E66.01])    Surgeons: Jacinto Weiss MD Responsible Provider: CARLOS Cleaning    Anesthesia Type: general ASA Status: 3            Anesthesia Type: general    Vitals Value Taken Time   BP 1`72/102 10/11/23 1631   Temp 36.0 10/11/23 1631   Pulse 105 10/11/23 1631   Resp 22 10/11/23 1631   SpO2 96 10/11/23 1631       Anesthesia Post Evaluation    Patient location during evaluation: bedside  Patient participation: complete - patient participated  Level of consciousness: awake and alert  Pain score: 8  Pain management: adequate  Airway patency: patent  Cardiovascular status: acceptable and blood pressure returned to baseline  Respiratory status: acceptable  Hydration status: acceptable        No notable events documented.

## 2023-10-11 NOTE — ANESTHESIA PROCEDURE NOTES
Airway  Date/Time: 10/11/2023 1:41 PM  Urgency: elective    Airway not difficult    Staffing  Performed: CRNA   Authorized by: CARLOS Cleaning    Performed by: CARLOS Cleaning  Patient location during procedure: OR    Indications and Patient Condition  Indications for airway management: anesthesia and airway protection  Spontaneous Ventilation: absent  Sedation level: deep  Preoxygenated: yes  Patient position: sniffing  Mask difficulty assessment: 2 - vent by mask + OA or adjuvant +/- NMBA    Final Airway Details  Final airway type: endotracheal airway      Successful airway: ETT  Cuffed: yes   Successful intubation technique: video laryngoscopy  Facilitating devices/methods: intubating stylet  Endotracheal tube insertion site: oral  Blade type: glidescope s3.  Blade size: #4  ETT size (mm): 7.5  Cormack-Lehane Classification: grade I - full view of glottis  Placement verified by: chest auscultation and capnometry   Cuff volume (mL): 10  Measured from: lips  Number of attempts at approach: 1    Additional Comments  Atraumatic intubation with glidescope, dentition in preanesthetic state.

## 2023-10-12 ENCOUNTER — APPOINTMENT (OUTPATIENT)
Dept: RADIOLOGY | Facility: HOSPITAL | Age: 43
DRG: 620 | End: 2023-10-12
Payer: COMMERCIAL

## 2023-10-12 LAB
ALBUMIN SERPL BCP-MCNC: 4 G/DL (ref 3.4–5)
ALP SERPL-CCNC: 47 U/L (ref 33–110)
ALT SERPL W P-5'-P-CCNC: 89 U/L (ref 7–45)
ANION GAP SERPL CALC-SCNC: 11 MMOL/L (ref 10–20)
AST SERPL W P-5'-P-CCNC: 65 U/L (ref 9–39)
BASOPHILS # BLD AUTO: 0.01 X10*3/UL (ref 0–0.1)
BASOPHILS NFR BLD AUTO: 0.1 %
BILIRUB SERPL-MCNC: 0.8 MG/DL (ref 0–1.2)
BUN SERPL-MCNC: 16 MG/DL (ref 6–23)
CALCIUM SERPL-MCNC: 8.6 MG/DL (ref 8.6–10.3)
CHLORIDE SERPL-SCNC: 103 MMOL/L (ref 98–107)
CO2 SERPL-SCNC: 28 MMOL/L (ref 21–32)
CREAT SERPL-MCNC: 0.68 MG/DL (ref 0.5–1.05)
EOSINOPHIL # BLD AUTO: 0 X10*3/UL (ref 0–0.7)
EOSINOPHIL NFR BLD AUTO: 0 %
ERYTHROCYTE [DISTWIDTH] IN BLOOD BY AUTOMATED COUNT: 14.4 % (ref 11.5–14.5)
GFR SERPL CREATININE-BSD FRML MDRD: >90 ML/MIN/1.73M*2
GLUCOSE SERPL-MCNC: 119 MG/DL (ref 74–99)
HCT VFR BLD AUTO: 36.5 % (ref 36–46)
HGB BLD-MCNC: 11.8 G/DL (ref 12–16)
IMM GRANULOCYTES # BLD AUTO: 0.03 X10*3/UL (ref 0–0.7)
IMM GRANULOCYTES NFR BLD AUTO: 0.3 % (ref 0–0.9)
LYMPHOCYTES # BLD AUTO: 1.21 X10*3/UL (ref 1.2–4.8)
LYMPHOCYTES NFR BLD AUTO: 13.3 %
MCH RBC QN AUTO: 28.5 PG (ref 26–34)
MCHC RBC AUTO-ENTMCNC: 32.3 G/DL (ref 32–36)
MCV RBC AUTO: 88 FL (ref 80–100)
MONOCYTES # BLD AUTO: 0.93 X10*3/UL (ref 0.1–1)
MONOCYTES NFR BLD AUTO: 10.2 %
NEUTROPHILS # BLD AUTO: 6.95 X10*3/UL (ref 1.2–7.7)
NEUTROPHILS NFR BLD AUTO: 76.1 %
NRBC BLD-RTO: 0 /100 WBCS (ref 0–0)
PLATELET # BLD AUTO: 340 X10*3/UL (ref 150–450)
PMV BLD AUTO: 9.8 FL (ref 7.5–11.5)
POTASSIUM SERPL-SCNC: 4 MMOL/L (ref 3.5–5.3)
PROT SERPL-MCNC: 6.1 G/DL (ref 6.4–8.2)
RBC # BLD AUTO: 4.14 X10*6/UL (ref 4–5.2)
SODIUM SERPL-SCNC: 138 MMOL/L (ref 136–145)
WBC # BLD AUTO: 9.1 X10*3/UL (ref 4.4–11.3)

## 2023-10-12 PROCEDURE — 2580000001 HC RX 258 IV SOLUTIONS: Performed by: STUDENT IN AN ORGANIZED HEALTH CARE EDUCATION/TRAINING PROGRAM

## 2023-10-12 PROCEDURE — 1100000001 HC PRIVATE ROOM DAILY

## 2023-10-12 PROCEDURE — 74240 X-RAY XM UPR GI TRC 1CNTRST: CPT | Performed by: RADIOLOGY

## 2023-10-12 PROCEDURE — 36415 COLL VENOUS BLD VENIPUNCTURE: CPT | Performed by: STUDENT IN AN ORGANIZED HEALTH CARE EDUCATION/TRAINING PROGRAM

## 2023-10-12 PROCEDURE — C9113 INJ PANTOPRAZOLE SODIUM, VIA: HCPCS | Performed by: STUDENT IN AN ORGANIZED HEALTH CARE EDUCATION/TRAINING PROGRAM

## 2023-10-12 PROCEDURE — 85025 COMPLETE CBC W/AUTO DIFF WBC: CPT | Performed by: STUDENT IN AN ORGANIZED HEALTH CARE EDUCATION/TRAINING PROGRAM

## 2023-10-12 PROCEDURE — 2500000001 HC RX 250 WO HCPCS SELF ADMINISTERED DRUGS (ALT 637 FOR MEDICARE OP): Performed by: STUDENT IN AN ORGANIZED HEALTH CARE EDUCATION/TRAINING PROGRAM

## 2023-10-12 PROCEDURE — 96372 THER/PROPH/DIAG INJ SC/IM: CPT | Performed by: STUDENT IN AN ORGANIZED HEALTH CARE EDUCATION/TRAINING PROGRAM

## 2023-10-12 PROCEDURE — 2500000004 HC RX 250 GENERAL PHARMACY W/ HCPCS (ALT 636 FOR OP/ED): Performed by: STUDENT IN AN ORGANIZED HEALTH CARE EDUCATION/TRAINING PROGRAM

## 2023-10-12 PROCEDURE — 74240 X-RAY XM UPR GI TRC 1CNTRST: CPT

## 2023-10-12 PROCEDURE — 80053 COMPREHEN METABOLIC PANEL: CPT | Performed by: STUDENT IN AN ORGANIZED HEALTH CARE EDUCATION/TRAINING PROGRAM

## 2023-10-12 RX ADMIN — ACETAMINOPHEN 650 MG: 160 SOLUTION ORAL at 13:41

## 2023-10-12 RX ADMIN — OXYCODONE HYDROCHLORIDE 10 MG: 5 SOLUTION ORAL at 21:16

## 2023-10-12 RX ADMIN — SODIUM CHLORIDE, POTASSIUM CHLORIDE, SODIUM LACTATE AND CALCIUM CHLORIDE 1000 ML: 600; 310; 30; 20 INJECTION, SOLUTION INTRAVENOUS at 15:00

## 2023-10-12 RX ADMIN — SIMETHICONE 80 MG: 80 TABLET, CHEWABLE ORAL at 11:05

## 2023-10-12 RX ADMIN — METRONIDAZOLE 500 MG: 500 INJECTION, SOLUTION INTRAVENOUS at 01:41

## 2023-10-12 RX ADMIN — SODIUM CHLORIDE, POTASSIUM CHLORIDE, SODIUM LACTATE AND CALCIUM CHLORIDE 150 ML/HR: 600; 310; 30; 20 INJECTION, SOLUTION INTRAVENOUS at 14:45

## 2023-10-12 RX ADMIN — PANTOPRAZOLE SODIUM 40 MG: 40 INJECTION, POWDER, FOR SOLUTION INTRAVENOUS at 06:31

## 2023-10-12 RX ADMIN — OXYCODONE HYDROCHLORIDE 10 MG: 5 SOLUTION ORAL at 03:23

## 2023-10-12 RX ADMIN — OXYCODONE HYDROCHLORIDE 10 MG: 5 SOLUTION ORAL at 15:40

## 2023-10-12 RX ADMIN — HEPARIN SODIUM 5000 UNITS: 5000 INJECTION INTRAVENOUS; SUBCUTANEOUS at 21:15

## 2023-10-12 RX ADMIN — KETOROLAC TROMETHAMINE 30 MG: 30 INJECTION, SOLUTION INTRAMUSCULAR; INTRAVENOUS at 00:30

## 2023-10-12 RX ADMIN — KETOROLAC TROMETHAMINE 30 MG: 30 INJECTION, SOLUTION INTRAMUSCULAR; INTRAVENOUS at 11:05

## 2023-10-12 RX ADMIN — KETOROLAC TROMETHAMINE 30 MG: 30 INJECTION, SOLUTION INTRAMUSCULAR; INTRAVENOUS at 18:19

## 2023-10-12 RX ADMIN — SODIUM CHLORIDE, POTASSIUM CHLORIDE, SODIUM LACTATE AND CALCIUM CHLORIDE 150 ML/HR: 600; 310; 30; 20 INJECTION, SOLUTION INTRAVENOUS at 08:25

## 2023-10-12 RX ADMIN — HEPARIN SODIUM 5000 UNITS: 5000 INJECTION INTRAVENOUS; SUBCUTANEOUS at 13:41

## 2023-10-12 RX ADMIN — HEPARIN SODIUM 5000 UNITS: 5000 INJECTION INTRAVENOUS; SUBCUTANEOUS at 06:33

## 2023-10-12 RX ADMIN — KETOROLAC TROMETHAMINE 30 MG: 30 INJECTION, SOLUTION INTRAMUSCULAR; INTRAVENOUS at 06:31

## 2023-10-12 RX ADMIN — OXYCODONE HYDROCHLORIDE 10 MG: 5 SOLUTION ORAL at 09:31

## 2023-10-12 RX ADMIN — DEXTROSE MONOHYDRATE 3 G: 5 INJECTION INTRAVENOUS at 00:31

## 2023-10-12 RX ADMIN — SIMETHICONE 80 MG: 80 TABLET, CHEWABLE ORAL at 15:40

## 2023-10-12 RX ADMIN — KETOROLAC TROMETHAMINE 30 MG: 30 INJECTION, SOLUTION INTRAMUSCULAR; INTRAVENOUS at 23:56

## 2023-10-12 ASSESSMENT — COGNITIVE AND FUNCTIONAL STATUS - GENERAL
CLIMB 3 TO 5 STEPS WITH RAILING: A LITTLE
TOILETING: A LITTLE
TURNING FROM BACK TO SIDE WHILE IN FLAT BAD: A LITTLE
WALKING IN HOSPITAL ROOM: A LITTLE
MOVING TO AND FROM BED TO CHAIR: A LITTLE
DRESSING REGULAR LOWER BODY CLOTHING: A LITTLE
DAILY ACTIVITIY SCORE: 22
MOBILITY SCORE: 18
STANDING UP FROM CHAIR USING ARMS: A LITTLE
MOVING FROM LYING ON BACK TO SITTING ON SIDE OF FLAT BED WITH BEDRAILS: A LITTLE

## 2023-10-12 ASSESSMENT — PAIN SCALES - GENERAL
PAINLEVEL_OUTOF10: 7
PAINLEVEL_OUTOF10: 5 - MODERATE PAIN
PAINLEVEL_OUTOF10: 5 - MODERATE PAIN
PAINLEVEL_OUTOF10: 7
PAINLEVEL_OUTOF10: 9
PAINLEVEL_OUTOF10: 6
PAINLEVEL_OUTOF10: 6
PAINLEVEL_OUTOF10: 4
PAINLEVEL_OUTOF10: 8
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
## 2023-10-12 NOTE — PROGRESS NOTES
Surgery Date:   10.11.23  Surgeon:  Isela  Procedure:  gastric bypass    ASSESSMENT:    Current weight pounds:  331.0  Ht:   68.0 in.        BMI: 50.3  Previous weight pounds:     338.0   9/28/23  Initial start weight pounds:    332.0   1/26/23  EBW pounds:168.0  Total weight change pounds: -1.0  %EBW Lost:     PROGRESS:    Nutrition Interventions for last encounter (date):   1. Drink 64 oz of fluid daily  2. Drink enough protein shakes to meet your goal of 60-70 g of protein per day  3. Take 2 chewable multivitamins, 8646-0055 mg of calcium citrate, and 500 mcg of B12 daily  4. Get up and walk frequently throughout the day.     CHANGES IN TREATMENT:   Patient met goals:     Partially   24 hour food recall:  Pt on a full liquid diet; 40-60 oz fluid and 30-45 g protein day  Beverages: water, Gatorade w/protein,  Hint water  Alcohol: none      Vitamins:  2 Flintstones,  500 mg (2 Barimelt Calcium), and B12  Medications: see list  Physical Activity: walking     READINESS TO LEARN:  Motivation to learn:           Interested      Understanding of instruction: Good   Anticipated Compliance:   Good     Family Support: Pt's  present and supportive.     Patient presents with post-op weight loss surgery gastric bypass. The pt is 1 week post op. Patient has lost 1.0 pounds since initial assessment..  Patient tolerating a full liquid diet with some difficulty.   Protein intake is not adequate for post-op individual. Fluid consumption is inadequate. Patient is  supplementing recommended vitamin/minerals but not taking enough calcium. Pt states concerns and/or difficulties with early satiety and feeling full when trying to drink her second protein shake. Suggested using unflavored protein powder and mixing it into different things like strained creamed soups.  Advised to keep working on increasing fluid and protein intake.   Reviewed the puree diet to be started on 10/25.     Malnutrition Screening  Significant  unintentional weight loss? n/a  Eating less than 75% of usual intake for more than 2 weeks? n/a      Nutrition Diagnosis:   1. Increased protein and nutrition needs related to altered GI function as evidenced by pt. s/p gastric bypass.  2. Food- and nutrition-related knowledge deficit related to lack of prior exposure to surgical weight loss information as evidenced by diet recall.     Nutrition Interventions:   1. Modify type and amount of food and nutrients within meals and snacks.  2. Comprehensive Nutrition Education  -Nutrition education materials: Support Group Schedule       Recommendations:    1. Continue to drink your protein shakes to meet your goal of 60-70 g of protein per day.  You can try adding unflavored protein powder to different things to help increase protein intake.   2. Keep working on increasing your fluid intake.  Add something to plain water to prevent feeling heart burn.    3. Begin  no drinking 30 min before, during the meal and for 30 minutes after the meal when you start the puree diet on 10/25.   4. Continue to walk   5. Advance to the puree diet on 10/25 for 2 weeks, then soft food  on 11/8 for 2 weeks.  if you do not tolerate the puree diet, go back to the liquid diet for a few more days and then try puree again.  See pages 12-14 for puree.  I will call you on 11/7 to review the soft food diet before you start it.   6. Remember to eat slowly and chew thoroughly  7. Try one new food at a time to test for any intolerances.   8. Continue to take all of your vitamins and minerals.   9.          Attend monthly support groups      Nutrition Monitoring and Evaluation:   1-2 pounds weight loss per week  Criteria: weight check, food recall  Need for Follow-up: 6 weeks post op       Madison SALGADO, [facility]  Bariatric Surgery Dietitian  Phone: [phone]  Fax: [phone]

## 2023-10-12 NOTE — CARE PLAN
Problem: Fall/Injury  Goal: Not fall by end of shift  10/11/2023 2007 by Maisha Mackay RN  Outcome: Progressing  10/11/2023 2002 by Maisha Mackay RN  Outcome: Progressing  Goal: Be free from injury by end of the shift  10/11/2023 2007 by Maisha Mackay RN  Outcome: Progressing  10/11/2023 2002 by Maisha Mackay RN  Outcome: Progressing  Goal: Verbalize understanding of personal risk factors for fall in the hospital  10/11/2023 2007 by Maisha Mackay RN  Outcome: Progressing  10/11/2023 2002 by Maisha Mackay RN  Outcome: Progressing  Goal: Verbalize understanding of risk factor reduction measures to prevent injury from fall in the home  10/11/2023 2007 by Maisha Mackay RN  Outcome: Progressing  10/11/2023 2002 by Maisha Mackay RN  Outcome: Progressing  Goal: Use assistive devices by end of the shift  10/11/2023 2007 by Maisha Mackay RN  Outcome: Progressing  10/11/2023 2002 by Maisha Mackya RN  Outcome: Progressing  Goal: Pace activities to prevent fatigue by end of the shift  10/11/2023 2007 by Maisha Mackay RN  Outcome: Progressing  10/11/2023 2002 by Maisha Mackay RN  Outcome: Progressing      No

## 2023-10-12 NOTE — CARE PLAN
Problem: Fall/Injury  Goal: Not fall by end of shift  Outcome: Progressing  Goal: Be free from injury by end of the shift  Outcome: Progressing  Goal: Verbalize understanding of personal risk factors for fall in the hospital  Outcome: Progressing  Goal: Verbalize understanding of risk factor reduction measures to prevent injury from fall in the home  Outcome: Progressing  Goal: Use assistive devices by end of the shift  Outcome: Progressing  Goal: Pace activities to prevent fatigue by end of the shift  Outcome: Progressing

## 2023-10-12 NOTE — PROGRESS NOTES
"Meli Tsang is a 43 y.o. female on day 1 of admission presenting with Obesity.    Subjective   Feeling well   No distress   Tolerating liquids          Objective   Awake and alert  Abdomen soft and nondistended  Appropriate tenderness  Incisions are healing well    No rigidity guarding or infection  Extremities are soft and supple        Last Recorded Vitals  Blood pressure 140/90, pulse 89, temperature 36.3 °C (97.3 °F), temperature source Temporal, resp. rate 18, height 1.727 m (5' 8\"), weight 150 kg (330 lb 11 oz), SpO2 94 %.  Intake/Output last 3 Shifts:  I/O last 3 completed shifts:  In: 1820 (12.1 mL/kg) [IV Piggyback:1820]  Out: 50 (0.3 mL/kg) [Emesis/NG output:50]  Weight: 150 kg     Relevant Results    Upper GI study negative for any leak             Assessment/Plan   Principal Problem:    Obesity    Status post gastric bypass postoperative day 1  Patient is doing well  We will continue full liquid diet  Incentive spirometry, ambulation, CPAP, DVT plexus, IV fluids  Possible DC tomorrow        Jacinto Weiss MD      "

## 2023-10-12 NOTE — CARE PLAN
Problem: Fall/Injury  Goal: Not fall by end of shift  Outcome: Progressing  Goal: Be free from injury by end of the shift  Outcome: Progressing  Goal: Verbalize understanding of personal risk factors for fall in the hospital  Outcome: Progressing  Goal: Verbalize understanding of risk factor reduction measures to prevent injury from fall in the home  Outcome: Progressing  Goal: Use assistive devices by end of the shift  Outcome: Progressing  Goal: Pace activities to prevent fatigue by end of the shift  Outcome: Progressing     Problem: Pain  Goal: Takes deep breaths with improved pain control throughout the shift  Outcome: Progressing  Goal: Turns in bed with improved pain control throughout the shift  Outcome: Progressing  Goal: Walks with improved pain control throughout the shift  Outcome: Progressing  Goal: Performs ADL's with improved pain control throughout shift  Outcome: Progressing  Goal: Participates in PT with improved pain control throughout the shift  Outcome: Progressing  Goal: Free from opioid side effects throughout the shift  Outcome: Progressing  Goal: Free from acute confusion related to pain meds throughout the shift  Outcome: Progressing     Problem: Pain  Goal: My pain/discomfort is manageable  Outcome: Progressing     Problem: Safety  Goal: Patient will be injury free during hospitalization  Outcome: Progressing  Goal: I will remain free of falls  Outcome: Progressing     Problem: Daily Care  Goal: Daily care needs are met  Outcome: Progressing     Problem: Psychosocial Needs  Goal: Demonstrates ability to cope with hospitalization/illness  Outcome: Progressing  Goal: Collaborate with me, my family, and caregiver to identify my specific goals  Outcome: Progressing     Problem: Discharge Barriers  Goal: My discharge needs are met  Outcome: Progressing   The patient's goals for the shift include      The clinical goals for the shift include pain management    Pt will use call light for  needs

## 2023-10-12 NOTE — PROGRESS NOTES
Nutrition Progress Note    Consult received for bariatric diet instructions. Pt is s/p gastric bypass. Pt is demonstrating proper completion of hydration monitoring worksheet per bariatric guidelines/protocol.  Reviewed importance of hydration during recovery process.  Pt had good understanding of this concept.

## 2023-10-13 ENCOUNTER — APPOINTMENT (OUTPATIENT)
Dept: RADIOLOGY | Facility: HOSPITAL | Age: 43
DRG: 620 | End: 2023-10-13
Payer: COMMERCIAL

## 2023-10-13 LAB
ANION GAP SERPL CALC-SCNC: 9 MMOL/L (ref 10–20)
APPEARANCE UR: CLEAR
BASOPHILS # BLD AUTO: 0.05 X10*3/UL (ref 0–0.1)
BASOPHILS NFR BLD AUTO: 0.4 %
BILIRUB UR STRIP.AUTO-MCNC: NEGATIVE MG/DL
BUN SERPL-MCNC: 13 MG/DL (ref 6–23)
CALCIUM SERPL-MCNC: 8.6 MG/DL (ref 8.6–10.3)
CHLORIDE SERPL-SCNC: 101 MMOL/L (ref 98–107)
CO2 SERPL-SCNC: 30 MMOL/L (ref 21–32)
COLOR UR: YELLOW
CREAT SERPL-MCNC: 0.67 MG/DL (ref 0.5–1.05)
EOSINOPHIL # BLD AUTO: 0.13 X10*3/UL (ref 0–0.7)
EOSINOPHIL NFR BLD AUTO: 1.1 %
ERYTHROCYTE [DISTWIDTH] IN BLOOD BY AUTOMATED COUNT: 14.8 % (ref 11.5–14.5)
GFR SERPL CREATININE-BSD FRML MDRD: >90 ML/MIN/1.73M*2
GLUCOSE SERPL-MCNC: 122 MG/DL (ref 74–99)
GLUCOSE UR STRIP.AUTO-MCNC: NEGATIVE MG/DL
HCT VFR BLD AUTO: 33.3 % (ref 36–46)
HGB BLD-MCNC: 10.7 G/DL (ref 12–16)
IMM GRANULOCYTES # BLD AUTO: 0.07 X10*3/UL (ref 0–0.7)
IMM GRANULOCYTES NFR BLD AUTO: 0.6 % (ref 0–0.9)
KETONES UR STRIP.AUTO-MCNC: NEGATIVE MG/DL
LEUKOCYTE ESTERASE UR QL STRIP.AUTO: ABNORMAL
LYMPHOCYTES # BLD AUTO: 1.89 X10*3/UL (ref 1.2–4.8)
LYMPHOCYTES NFR BLD AUTO: 15.7 %
MCH RBC QN AUTO: 28.5 PG (ref 26–34)
MCHC RBC AUTO-ENTMCNC: 32.1 G/DL (ref 32–36)
MCV RBC AUTO: 89 FL (ref 80–100)
MONOCYTES # BLD AUTO: 0.64 X10*3/UL (ref 0.1–1)
MONOCYTES NFR BLD AUTO: 5.3 %
NEUTROPHILS # BLD AUTO: 9.28 X10*3/UL (ref 1.2–7.7)
NEUTROPHILS NFR BLD AUTO: 76.9 %
NITRITE UR QL STRIP.AUTO: NEGATIVE
NRBC BLD-RTO: 0 /100 WBCS (ref 0–0)
PH UR STRIP.AUTO: 6 [PH]
PLATELET # BLD AUTO: 267 X10*3/UL (ref 150–450)
PMV BLD AUTO: 9.8 FL (ref 7.5–11.5)
POTASSIUM SERPL-SCNC: 3.4 MMOL/L (ref 3.5–5.3)
PROT UR STRIP.AUTO-MCNC: NEGATIVE MG/DL
RBC # BLD AUTO: 3.76 X10*6/UL (ref 4–5.2)
RBC # UR STRIP.AUTO: ABNORMAL /UL
RBC #/AREA URNS AUTO: NORMAL /HPF
SODIUM SERPL-SCNC: 137 MMOL/L (ref 136–145)
SP GR UR STRIP.AUTO: 1.01
SQUAMOUS #/AREA URNS AUTO: NORMAL /HPF
UROBILINOGEN UR STRIP.AUTO-MCNC: <2 MG/DL
WBC # BLD AUTO: 12.1 X10*3/UL (ref 4.4–11.3)
WBC #/AREA URNS AUTO: NORMAL /HPF

## 2023-10-13 PROCEDURE — 71275 CT ANGIOGRAPHY CHEST: CPT

## 2023-10-13 PROCEDURE — 74177 CT ABD & PELVIS W/CONTRAST: CPT

## 2023-10-13 PROCEDURE — 81001 URINALYSIS AUTO W/SCOPE: CPT | Performed by: STUDENT IN AN ORGANIZED HEALTH CARE EDUCATION/TRAINING PROGRAM

## 2023-10-13 PROCEDURE — 96372 THER/PROPH/DIAG INJ SC/IM: CPT | Performed by: STUDENT IN AN ORGANIZED HEALTH CARE EDUCATION/TRAINING PROGRAM

## 2023-10-13 PROCEDURE — 1210000001 HC SEMI-PRIVATE ROOM DAILY

## 2023-10-13 PROCEDURE — 2500000004 HC RX 250 GENERAL PHARMACY W/ HCPCS (ALT 636 FOR OP/ED): Performed by: STUDENT IN AN ORGANIZED HEALTH CARE EDUCATION/TRAINING PROGRAM

## 2023-10-13 PROCEDURE — 36415 COLL VENOUS BLD VENIPUNCTURE: CPT | Performed by: STUDENT IN AN ORGANIZED HEALTH CARE EDUCATION/TRAINING PROGRAM

## 2023-10-13 PROCEDURE — 2580000001 HC RX 258 IV SOLUTIONS: Performed by: STUDENT IN AN ORGANIZED HEALTH CARE EDUCATION/TRAINING PROGRAM

## 2023-10-13 PROCEDURE — C9113 INJ PANTOPRAZOLE SODIUM, VIA: HCPCS | Performed by: STUDENT IN AN ORGANIZED HEALTH CARE EDUCATION/TRAINING PROGRAM

## 2023-10-13 PROCEDURE — 5A09357 ASSISTANCE WITH RESPIRATORY VENTILATION, LESS THAN 24 CONSECUTIVE HOURS, CONTINUOUS POSITIVE AIRWAY PRESSURE: ICD-10-PCS | Performed by: ANESTHESIOLOGY

## 2023-10-13 PROCEDURE — 2500000001 HC RX 250 WO HCPCS SELF ADMINISTERED DRUGS (ALT 637 FOR MEDICARE OP): Performed by: STUDENT IN AN ORGANIZED HEALTH CARE EDUCATION/TRAINING PROGRAM

## 2023-10-13 PROCEDURE — 80048 BASIC METABOLIC PNL TOTAL CA: CPT | Performed by: STUDENT IN AN ORGANIZED HEALTH CARE EDUCATION/TRAINING PROGRAM

## 2023-10-13 PROCEDURE — 85025 COMPLETE CBC W/AUTO DIFF WBC: CPT | Performed by: STUDENT IN AN ORGANIZED HEALTH CARE EDUCATION/TRAINING PROGRAM

## 2023-10-13 PROCEDURE — 2550000001 HC RX 255 CONTRASTS: Performed by: SURGERY

## 2023-10-13 RX ORDER — SERTRALINE HYDROCHLORIDE 50 MG/1
50 TABLET, FILM COATED ORAL DAILY
Status: DISCONTINUED | OUTPATIENT
Start: 2023-10-13 | End: 2023-10-16 | Stop reason: HOSPADM

## 2023-10-13 RX ADMIN — HEPARIN SODIUM 5000 UNITS: 5000 INJECTION INTRAVENOUS; SUBCUTANEOUS at 14:00

## 2023-10-13 RX ADMIN — Medication 2 L/MIN: at 06:00

## 2023-10-13 RX ADMIN — HEPARIN SODIUM 5000 UNITS: 5000 INJECTION INTRAVENOUS; SUBCUTANEOUS at 21:31

## 2023-10-13 RX ADMIN — OXYCODONE HYDROCHLORIDE 10 MG: 5 SOLUTION ORAL at 05:27

## 2023-10-13 RX ADMIN — HEPARIN SODIUM 5000 UNITS: 5000 INJECTION INTRAVENOUS; SUBCUTANEOUS at 05:26

## 2023-10-13 RX ADMIN — OXYCODONE HYDROCHLORIDE 10 MG: 5 SOLUTION ORAL at 19:07

## 2023-10-13 RX ADMIN — SODIUM CHLORIDE, POTASSIUM CHLORIDE, SODIUM LACTATE AND CALCIUM CHLORIDE 150 ML/HR: 600; 310; 30; 20 INJECTION, SOLUTION INTRAVENOUS at 14:33

## 2023-10-13 RX ADMIN — SODIUM CHLORIDE, POTASSIUM CHLORIDE, SODIUM LACTATE AND CALCIUM CHLORIDE 1000 ML: 600; 310; 30; 20 INJECTION, SOLUTION INTRAVENOUS at 06:30

## 2023-10-13 RX ADMIN — HYDROMORPHONE HYDROCHLORIDE 0.2 MG: 1 INJECTION, SOLUTION INTRAMUSCULAR; INTRAVENOUS; SUBCUTANEOUS at 14:42

## 2023-10-13 RX ADMIN — OXYCODONE HYDROCHLORIDE 10 MG: 5 SOLUTION ORAL at 12:18

## 2023-10-13 RX ADMIN — SIMETHICONE 80 MG: 80 TABLET, CHEWABLE ORAL at 08:25

## 2023-10-13 RX ADMIN — SODIUM CHLORIDE, POTASSIUM CHLORIDE, SODIUM LACTATE AND CALCIUM CHLORIDE 150 ML/HR: 600; 310; 30; 20 INJECTION, SOLUTION INTRAVENOUS at 21:31

## 2023-10-13 RX ADMIN — ACETAMINOPHEN 650 MG: 160 SOLUTION ORAL at 16:10

## 2023-10-13 RX ADMIN — Medication 2 L/MIN: at 19:00

## 2023-10-13 RX ADMIN — IOHEXOL 100 ML: 350 INJECTION, SOLUTION INTRAVENOUS at 13:28

## 2023-10-13 RX ADMIN — SODIUM CHLORIDE, POTASSIUM CHLORIDE, SODIUM LACTATE AND CALCIUM CHLORIDE 150 ML/HR: 600; 310; 30; 20 INJECTION, SOLUTION INTRAVENOUS at 00:00

## 2023-10-13 RX ADMIN — SERTRALINE HYDROCHLORIDE 50 MG: 50 TABLET ORAL at 09:33

## 2023-10-13 RX ADMIN — ACETAMINOPHEN 650 MG: 160 SOLUTION ORAL at 08:25

## 2023-10-13 RX ADMIN — PANTOPRAZOLE SODIUM 40 MG: 40 INJECTION, POWDER, FOR SOLUTION INTRAVENOUS at 05:41

## 2023-10-13 RX ADMIN — SODIUM CHLORIDE, POTASSIUM CHLORIDE, SODIUM LACTATE AND CALCIUM CHLORIDE 1000 ML: 600; 310; 30; 20 INJECTION, SOLUTION INTRAVENOUS at 09:43

## 2023-10-13 RX ADMIN — HYDROMORPHONE HYDROCHLORIDE 0.2 MG: 1 INJECTION, SOLUTION INTRAMUSCULAR; INTRAVENOUS; SUBCUTANEOUS at 09:33

## 2023-10-13 ASSESSMENT — PAIN SCALES - GENERAL
PAINLEVEL_OUTOF10: 9
PAINLEVEL_OUTOF10: 7
PAINLEVEL_OUTOF10: 9
PAINLEVEL_OUTOF10: 3
PAINLEVEL_OUTOF10: 7

## 2023-10-13 ASSESSMENT — COGNITIVE AND FUNCTIONAL STATUS - GENERAL
WALKING IN HOSPITAL ROOM: A LITTLE
CLIMB 3 TO 5 STEPS WITH RAILING: A LITTLE
TURNING FROM BACK TO SIDE WHILE IN FLAT BAD: A LITTLE
MOBILITY SCORE: 18
DAILY ACTIVITIY SCORE: 24
MOVING FROM LYING ON BACK TO SITTING ON SIDE OF FLAT BED WITH BEDRAILS: A LITTLE
MOVING TO AND FROM BED TO CHAIR: A LITTLE
STANDING UP FROM CHAIR USING ARMS: A LITTLE

## 2023-10-13 NOTE — PROGRESS NOTES
"Meli Tsang is a 43 y.o. female on day 2 of admission presenting with Obesity.    Subjective   Patient was doing well up until early this AM.   She has been tolerating 6-8oz of liquids each hour. However, her urine is still dark. She received 1L bolus yesterday evening then another one this morning. She denied fever/chills. She states it is difficult to take a deep breath and has required oxygen.     Objective     Physical Exam  Vitals reviewed.   Constitutional:       Appearance: Normal appearance. She is obese.   HENT:      Head: Normocephalic and atraumatic.      Nose: Nose normal.      Mouth/Throat:      Mouth: Mucous membranes are moist.   Eyes:      Extraocular Movements: Extraocular movements intact.      Pupils: Pupils are equal, round, and reactive to light.   Cardiovascular:      Rate and Rhythm: Normal rate and regular rhythm.   Pulmonary:      Effort: Pulmonary effort is normal.   Abdominal:      General: There is distension (mild).      Palpations: Abdomen is soft.      Tenderness: There is abdominal tenderness (appropriate incisional).      Comments: Incisions c/d/I. Binder on   Musculoskeletal:         General: Normal range of motion.      Cervical back: Normal range of motion and neck supple.   Skin:     General: Skin is warm and dry.      Capillary Refill: Capillary refill takes less than 2 seconds.   Neurological:      General: No focal deficit present.      Mental Status: She is alert.   Psychiatric:         Mood and Affect: Mood normal.         Behavior: Behavior normal.         Thought Content: Thought content normal.         Judgment: Judgment normal.         Last Recorded Vitals  Blood pressure 123/77, pulse 105, temperature 36.3 °C (97.3 °F), resp. rate 20, height 1.727 m (5' 8\"), weight 150 kg (330 lb 11 oz), SpO2 92 %.  Intake/Output last 3 Shifts:  I/O last 3 completed shifts:  In: 3561.9 (23.7 mL/kg) [P.O.:1200; I.V.:1362 (9.1 mL/kg); IV Piggyback:999.9]  Out: 1050 (7 mL/kg) [Urine:1050 " (0.2 mL/kg/hr)]  Weight: 150 kg        Assessment/Plan   Principal Problem:    Obesity    43F s/p robotic bypass was doing well overall.     -UGI reviewed - advanced to bariatric full liquid diet   -continue IVF at 150. Ordering 1L bolus LR  -Pain and nausea control PRN  -CT chest to rule out PE  -SQH , SCDs, and ambulation    Maritza Hill MD

## 2023-10-13 NOTE — CARE PLAN
The patient's goals for the shift include      The clinical goals for the shift include pain <4/10    Pt will  have no c/ o chest pain by end of shift

## 2023-10-13 NOTE — CARE PLAN
The patient's goals for the shift include      The clinical goals for the shift include pain <4/10    Pt will have ct today

## 2023-10-14 LAB
ANION GAP SERPL CALC-SCNC: 11 MMOL/L (ref 10–20)
BUN SERPL-MCNC: 5 MG/DL (ref 6–23)
CALCIUM SERPL-MCNC: 8.8 MG/DL (ref 8.6–10.3)
CARDIAC TROPONIN I PNL SERPL HS: 38 NG/L (ref 0–13)
CHLORIDE SERPL-SCNC: 98 MMOL/L (ref 98–107)
CO2 SERPL-SCNC: 30 MMOL/L (ref 21–32)
CREAT SERPL-MCNC: 0.54 MG/DL (ref 0.5–1.05)
ERYTHROCYTE [DISTWIDTH] IN BLOOD BY AUTOMATED COUNT: 14.6 % (ref 11.5–14.5)
GFR SERPL CREATININE-BSD FRML MDRD: >90 ML/MIN/1.73M*2
GLUCOSE SERPL-MCNC: 103 MG/DL (ref 74–99)
HCT VFR BLD AUTO: 32.5 % (ref 36–46)
HGB BLD-MCNC: 10.5 G/DL (ref 12–16)
MCH RBC QN AUTO: 28.9 PG (ref 26–34)
MCHC RBC AUTO-ENTMCNC: 32.3 G/DL (ref 32–36)
MCV RBC AUTO: 90 FL (ref 80–100)
NRBC BLD-RTO: 0 /100 WBCS (ref 0–0)
PLATELET # BLD AUTO: 279 X10*3/UL (ref 150–450)
PMV BLD AUTO: 10 FL (ref 7.5–11.5)
POTASSIUM SERPL-SCNC: 3.4 MMOL/L (ref 3.5–5.3)
RBC # BLD AUTO: 3.63 X10*6/UL (ref 4–5.2)
SODIUM SERPL-SCNC: 136 MMOL/L (ref 136–145)
WBC # BLD AUTO: 12.3 X10*3/UL (ref 4.4–11.3)

## 2023-10-14 PROCEDURE — 1210000001 HC SEMI-PRIVATE ROOM DAILY

## 2023-10-14 PROCEDURE — 36415 COLL VENOUS BLD VENIPUNCTURE: CPT | Performed by: STUDENT IN AN ORGANIZED HEALTH CARE EDUCATION/TRAINING PROGRAM

## 2023-10-14 PROCEDURE — 2500000001 HC RX 250 WO HCPCS SELF ADMINISTERED DRUGS (ALT 637 FOR MEDICARE OP): Performed by: STUDENT IN AN ORGANIZED HEALTH CARE EDUCATION/TRAINING PROGRAM

## 2023-10-14 PROCEDURE — 96372 THER/PROPH/DIAG INJ SC/IM: CPT | Performed by: STUDENT IN AN ORGANIZED HEALTH CARE EDUCATION/TRAINING PROGRAM

## 2023-10-14 PROCEDURE — C9113 INJ PANTOPRAZOLE SODIUM, VIA: HCPCS | Performed by: STUDENT IN AN ORGANIZED HEALTH CARE EDUCATION/TRAINING PROGRAM

## 2023-10-14 PROCEDURE — 93010 ELECTROCARDIOGRAM REPORT: CPT | Performed by: STUDENT IN AN ORGANIZED HEALTH CARE EDUCATION/TRAINING PROGRAM

## 2023-10-14 PROCEDURE — 85027 COMPLETE CBC AUTOMATED: CPT | Performed by: STUDENT IN AN ORGANIZED HEALTH CARE EDUCATION/TRAINING PROGRAM

## 2023-10-14 PROCEDURE — 2500000004 HC RX 250 GENERAL PHARMACY W/ HCPCS (ALT 636 FOR OP/ED): Performed by: STUDENT IN AN ORGANIZED HEALTH CARE EDUCATION/TRAINING PROGRAM

## 2023-10-14 PROCEDURE — 84484 ASSAY OF TROPONIN QUANT: CPT | Performed by: STUDENT IN AN ORGANIZED HEALTH CARE EDUCATION/TRAINING PROGRAM

## 2023-10-14 PROCEDURE — 2580000001 HC RX 258 IV SOLUTIONS: Performed by: STUDENT IN AN ORGANIZED HEALTH CARE EDUCATION/TRAINING PROGRAM

## 2023-10-14 PROCEDURE — 80048 BASIC METABOLIC PNL TOTAL CA: CPT | Performed by: STUDENT IN AN ORGANIZED HEALTH CARE EDUCATION/TRAINING PROGRAM

## 2023-10-14 RX ORDER — KETOROLAC TROMETHAMINE 30 MG/ML
15 INJECTION, SOLUTION INTRAMUSCULAR; INTRAVENOUS ONCE
Status: DISCONTINUED | OUTPATIENT
Start: 2023-10-14 | End: 2023-10-16 | Stop reason: HOSPADM

## 2023-10-14 RX ORDER — LEVOTHYROXINE SODIUM 50 UG/1
50 TABLET ORAL DAILY
Status: DISCONTINUED | OUTPATIENT
Start: 2023-10-14 | End: 2023-10-16 | Stop reason: HOSPADM

## 2023-10-14 RX ADMIN — SODIUM CHLORIDE, POTASSIUM CHLORIDE, SODIUM LACTATE AND CALCIUM CHLORIDE 150 ML/HR: 600; 310; 30; 20 INJECTION, SOLUTION INTRAVENOUS at 03:32

## 2023-10-14 RX ADMIN — PANTOPRAZOLE SODIUM 40 MG: 40 INJECTION, POWDER, FOR SOLUTION INTRAVENOUS at 06:24

## 2023-10-14 RX ADMIN — SERTRALINE HYDROCHLORIDE 50 MG: 50 TABLET ORAL at 08:13

## 2023-10-14 RX ADMIN — HEPARIN SODIUM 5000 UNITS: 5000 INJECTION INTRAVENOUS; SUBCUTANEOUS at 22:38

## 2023-10-14 RX ADMIN — OXYCODONE HYDROCHLORIDE 10 MG: 5 SOLUTION ORAL at 14:31

## 2023-10-14 RX ADMIN — HEPARIN SODIUM 5000 UNITS: 5000 INJECTION INTRAVENOUS; SUBCUTANEOUS at 06:24

## 2023-10-14 RX ADMIN — Medication 3 L/MIN: at 03:08

## 2023-10-14 RX ADMIN — LEVOTHYROXINE SODIUM 50 MCG: 0.05 TABLET ORAL at 12:15

## 2023-10-14 RX ADMIN — HEPARIN SODIUM 5000 UNITS: 5000 INJECTION INTRAVENOUS; SUBCUTANEOUS at 14:31

## 2023-10-14 RX ADMIN — OXYCODONE HYDROCHLORIDE 10 MG: 5 SOLUTION ORAL at 01:44

## 2023-10-14 RX ADMIN — OXYCODONE HYDROCHLORIDE 10 MG: 5 SOLUTION ORAL at 19:03

## 2023-10-14 RX ADMIN — SIMETHICONE 80 MG: 80 TABLET, CHEWABLE ORAL at 20:07

## 2023-10-14 RX ADMIN — OXYCODONE HYDROCHLORIDE 10 MG: 5 SOLUTION ORAL at 08:13

## 2023-10-14 ASSESSMENT — PAIN SCALES - GENERAL
PAINLEVEL_OUTOF10: 9
PAINLEVEL_OUTOF10: 9
PAINLEVEL_OUTOF10: 2
PAINLEVEL_OUTOF10: 9
PAINLEVEL_OUTOF10: 4
PAINLEVEL_OUTOF10: 8
PAINLEVEL_OUTOF10: 2
PAINLEVEL_OUTOF10: 2

## 2023-10-14 ASSESSMENT — PAIN - FUNCTIONAL ASSESSMENT
PAIN_FUNCTIONAL_ASSESSMENT: 0-10

## 2023-10-14 ASSESSMENT — PAIN DESCRIPTION - DESCRIPTORS: DESCRIPTORS: OTHER (COMMENT)

## 2023-10-14 NOTE — CARE PLAN
Problem: Fall/Injury  Goal: Not fall by end of shift  Outcome: Progressing  Goal: Be free from injury by end of the shift  Outcome: Progressing  Goal: Verbalize understanding of personal risk factors for fall in the hospital  Outcome: Progressing  Goal: Verbalize understanding of risk factor reduction measures to prevent injury from fall in the home  Outcome: Progressing  Goal: Use assistive devices by end of the shift  Outcome: Progressing  Goal: Pace activities to prevent fatigue by end of the shift  Outcome: Progressing   The patient's goals for the shift include      The clinical goals for the shift include pain <4/10

## 2023-10-14 NOTE — PROGRESS NOTES
"Meli Tsang is a 43 y.o. female on day 3 of admission presenting with Obesity.    Subjective   Overnight, required oxygen and was tachycardia. She attributed it when she is walking. She stated that her oxygen levels go further down with the CPAP. RT on board. She has no nausea/vomiting with the PO intake and has been tolerating it at goal. Has been ambulating.        Objective     Physical Exam  Constitutional:       Appearance: Normal appearance. She is obese.   HENT:      Head: Normocephalic and atraumatic.      Nose: Nose normal.      Mouth/Throat:      Mouth: Mucous membranes are moist.   Eyes:      Extraocular Movements: Extraocular movements intact.      Pupils: Pupils are equal, round, and reactive to light.   Cardiovascular:      Rate and Rhythm: Normal rate and regular rhythm.   Pulmonary:      Effort: Pulmonary effort is normal.   Abdominal:      General: There is distension (mild).      Palpations: Abdomen is soft.      Tenderness: There is abdominal tenderness (appropriate incisional).      Comments: Incisions c/d/I. Binder on   Musculoskeletal:         General: Normal range of motion.      Cervical back: Normal range of motion and neck supple.   Skin:     General: Skin is warm and dry.      Capillary Refill: Capillary refill takes less than 2 seconds.   Neurological:      General: No focal deficit present.      Mental Status: She is alert.   Psychiatric:         Mood and Affect: Mood normal.         Behavior: Behavior normal.         Thought Content: Thought content normal.         Judgment: Judgment normal.     Last Recorded Vitals  Blood pressure 136/73, pulse 98, temperature 35.9 °C (96.6 °F), temperature source Temporal, resp. rate 20, height 1.727 m (5' 8\"), weight 150 kg (330 lb 11 oz), SpO2 98 %.  Intake/Output last 3 Shifts:  I/O last 3 completed shifts:  In: 6129.3 (40.9 mL/kg) [P.O.:180; I.V.:3949.5 (26.3 mL/kg); IV Piggyback:1999.8]  Out: 650 (4.3 mL/kg) [Urine:650 (0.1 mL/kg/hr)]  Weight: " 150 kg     Relevant Results  Results for orders placed or performed during the hospital encounter of 10/11/23 (from the past 24 hour(s))   Urinalysis with Reflex Microscopic   Result Value Ref Range    Color, Urine Yellow Straw, Yellow    Appearance, Urine Clear Clear    Specific Gravity, Urine 1.010 1.005 - 1.035    pH, Urine 6.0 5.0, 5.5, 6.0, 6.5, 7.0, 7.5, 8.0    Protein, Urine NEGATIVE NEGATIVE mg/dL    Glucose, Urine NEGATIVE NEGATIVE mg/dL    Blood, Urine SMALL (1+) (A) NEGATIVE    Ketones, Urine NEGATIVE NEGATIVE mg/dL    Bilirubin, Urine NEGATIVE NEGATIVE    Urobilinogen, Urine <2.0 <2.0 mg/dL    Nitrite, Urine NEGATIVE NEGATIVE    Leukocyte Esterase, Urine TRACE (A) NEGATIVE   Urinalysis Microscopic Only   Result Value Ref Range    WBC, Urine 1-5 1-5, NONE /HPF    RBC, Urine 1-2 NONE, 1-2, 3-5 /HPF    Squamous Epithelial Cells, Urine 1-9 (SPARSE) Reference range not established. /HPF                  Assessment/Plan   Principal Problem:    Obesity    43F s/p robotic bypass at goal with PO intake but now requiring oxygen.   CT negative for PE.     -continue bariatric FLD  -continue IVF at 150  -Pain and nausea control PRN  -SQH , SCDs, and ambulation. Encouraged use of IS.     Maritza Hill MD

## 2023-10-15 ENCOUNTER — APPOINTMENT (OUTPATIENT)
Dept: RADIOLOGY | Facility: HOSPITAL | Age: 43
DRG: 620 | End: 2023-10-15
Payer: COMMERCIAL

## 2023-10-15 LAB
ANION GAP SERPL CALC-SCNC: 10 MMOL/L (ref 10–20)
BASOPHILS # BLD AUTO: 0.03 X10*3/UL (ref 0–0.1)
BASOPHILS NFR BLD AUTO: 0.3 %
BUN SERPL-MCNC: 6 MG/DL (ref 6–23)
CALCIUM SERPL-MCNC: 8.5 MG/DL (ref 8.6–10.3)
CHLORIDE SERPL-SCNC: 100 MMOL/L (ref 98–107)
CO2 SERPL-SCNC: 34 MMOL/L (ref 21–32)
CREAT SERPL-MCNC: 0.57 MG/DL (ref 0.5–1.05)
EOSINOPHIL # BLD AUTO: 0.34 X10*3/UL (ref 0–0.7)
EOSINOPHIL NFR BLD AUTO: 3.4 %
ERYTHROCYTE [DISTWIDTH] IN BLOOD BY AUTOMATED COUNT: 14.6 % (ref 11.5–14.5)
GFR SERPL CREATININE-BSD FRML MDRD: >90 ML/MIN/1.73M*2
GLUCOSE SERPL-MCNC: 108 MG/DL (ref 74–99)
HCT VFR BLD AUTO: 32.2 % (ref 36–46)
HGB BLD-MCNC: 10 G/DL (ref 12–16)
IMM GRANULOCYTES # BLD AUTO: 0.03 X10*3/UL (ref 0–0.7)
IMM GRANULOCYTES NFR BLD AUTO: 0.3 % (ref 0–0.9)
LYMPHOCYTES # BLD AUTO: 1.82 X10*3/UL (ref 1.2–4.8)
LYMPHOCYTES NFR BLD AUTO: 18.4 %
MCH RBC QN AUTO: 28.2 PG (ref 26–34)
MCHC RBC AUTO-ENTMCNC: 31.1 G/DL (ref 32–36)
MCV RBC AUTO: 91 FL (ref 80–100)
MONOCYTES # BLD AUTO: 0.77 X10*3/UL (ref 0.1–1)
MONOCYTES NFR BLD AUTO: 7.8 %
NEUTROPHILS # BLD AUTO: 6.91 X10*3/UL (ref 1.2–7.7)
NEUTROPHILS NFR BLD AUTO: 69.8 %
NRBC BLD-RTO: 0 /100 WBCS (ref 0–0)
PLATELET # BLD AUTO: 303 X10*3/UL (ref 150–450)
PMV BLD AUTO: 9.8 FL (ref 7.5–11.5)
POTASSIUM SERPL-SCNC: 3.8 MMOL/L (ref 3.5–5.3)
RBC # BLD AUTO: 3.55 X10*6/UL (ref 4–5.2)
SODIUM SERPL-SCNC: 140 MMOL/L (ref 136–145)
WBC # BLD AUTO: 9.9 X10*3/UL (ref 4.4–11.3)

## 2023-10-15 PROCEDURE — 1210000001 HC SEMI-PRIVATE ROOM DAILY

## 2023-10-15 PROCEDURE — 2500000001 HC RX 250 WO HCPCS SELF ADMINISTERED DRUGS (ALT 637 FOR MEDICARE OP): Performed by: STUDENT IN AN ORGANIZED HEALTH CARE EDUCATION/TRAINING PROGRAM

## 2023-10-15 PROCEDURE — 36415 COLL VENOUS BLD VENIPUNCTURE: CPT | Performed by: STUDENT IN AN ORGANIZED HEALTH CARE EDUCATION/TRAINING PROGRAM

## 2023-10-15 PROCEDURE — 71045 X-RAY EXAM CHEST 1 VIEW: CPT

## 2023-10-15 PROCEDURE — 85025 COMPLETE CBC W/AUTO DIFF WBC: CPT | Performed by: STUDENT IN AN ORGANIZED HEALTH CARE EDUCATION/TRAINING PROGRAM

## 2023-10-15 PROCEDURE — 80048 BASIC METABOLIC PNL TOTAL CA: CPT | Performed by: STUDENT IN AN ORGANIZED HEALTH CARE EDUCATION/TRAINING PROGRAM

## 2023-10-15 PROCEDURE — 2500000004 HC RX 250 GENERAL PHARMACY W/ HCPCS (ALT 636 FOR OP/ED): Performed by: STUDENT IN AN ORGANIZED HEALTH CARE EDUCATION/TRAINING PROGRAM

## 2023-10-15 PROCEDURE — C9113 INJ PANTOPRAZOLE SODIUM, VIA: HCPCS | Performed by: STUDENT IN AN ORGANIZED HEALTH CARE EDUCATION/TRAINING PROGRAM

## 2023-10-15 PROCEDURE — 71045 X-RAY EXAM CHEST 1 VIEW: CPT | Performed by: STUDENT IN AN ORGANIZED HEALTH CARE EDUCATION/TRAINING PROGRAM

## 2023-10-15 PROCEDURE — 2580000001 HC RX 258 IV SOLUTIONS: Performed by: STUDENT IN AN ORGANIZED HEALTH CARE EDUCATION/TRAINING PROGRAM

## 2023-10-15 PROCEDURE — 96372 THER/PROPH/DIAG INJ SC/IM: CPT | Performed by: STUDENT IN AN ORGANIZED HEALTH CARE EDUCATION/TRAINING PROGRAM

## 2023-10-15 RX ORDER — FUROSEMIDE 10 MG/ML
20 INJECTION INTRAMUSCULAR; INTRAVENOUS ONCE
Status: COMPLETED | OUTPATIENT
Start: 2023-10-15 | End: 2023-10-15

## 2023-10-15 RX ADMIN — SIMETHICONE 80 MG: 80 TABLET, CHEWABLE ORAL at 14:36

## 2023-10-15 RX ADMIN — OXYCODONE HYDROCHLORIDE 10 MG: 5 SOLUTION ORAL at 16:37

## 2023-10-15 RX ADMIN — ACETAMINOPHEN 650 MG: 160 SOLUTION ORAL at 21:52

## 2023-10-15 RX ADMIN — HEPARIN SODIUM 5000 UNITS: 5000 INJECTION INTRAVENOUS; SUBCUTANEOUS at 21:52

## 2023-10-15 RX ADMIN — SERTRALINE HYDROCHLORIDE 50 MG: 50 TABLET ORAL at 08:45

## 2023-10-15 RX ADMIN — LEVOTHYROXINE SODIUM 50 MCG: 0.05 TABLET ORAL at 06:30

## 2023-10-15 RX ADMIN — HEPARIN SODIUM 5000 UNITS: 5000 INJECTION INTRAVENOUS; SUBCUTANEOUS at 06:17

## 2023-10-15 RX ADMIN — PANTOPRAZOLE SODIUM 40 MG: 40 INJECTION, POWDER, FOR SOLUTION INTRAVENOUS at 06:17

## 2023-10-15 RX ADMIN — HEPARIN SODIUM 5000 UNITS: 5000 INJECTION INTRAVENOUS; SUBCUTANEOUS at 14:46

## 2023-10-15 RX ADMIN — SODIUM CHLORIDE, POTASSIUM CHLORIDE, SODIUM LACTATE AND CALCIUM CHLORIDE 150 ML/HR: 600; 310; 30; 20 INJECTION, SOLUTION INTRAVENOUS at 06:17

## 2023-10-15 RX ADMIN — FUROSEMIDE 20 MG: 10 INJECTION, SOLUTION INTRAMUSCULAR; INTRAVENOUS at 12:12

## 2023-10-15 RX ADMIN — HYDROMORPHONE HYDROCHLORIDE 0.2 MG: 1 INJECTION, SOLUTION INTRAMUSCULAR; INTRAVENOUS; SUBCUTANEOUS at 06:18

## 2023-10-15 RX ADMIN — OXYCODONE HYDROCHLORIDE 10 MG: 5 SOLUTION ORAL at 01:03

## 2023-10-15 RX ADMIN — SIMETHICONE 80 MG: 80 TABLET, CHEWABLE ORAL at 06:17

## 2023-10-15 ASSESSMENT — PAIN - FUNCTIONAL ASSESSMENT
PAIN_FUNCTIONAL_ASSESSMENT: 0-10

## 2023-10-15 ASSESSMENT — PAIN SCALES - GENERAL
PAINLEVEL_OUTOF10: 0 - NO PAIN
PAINLEVEL_OUTOF10: 1
PAINLEVEL_OUTOF10: 7
PAINLEVEL_OUTOF10: 3
PAINLEVEL_OUTOF10: 9
PAINLEVEL_OUTOF10: 8
PAINLEVEL_OUTOF10: 3

## 2023-10-15 ASSESSMENT — PAIN DESCRIPTION - DESCRIPTORS: DESCRIPTORS: ACHING

## 2023-10-15 NOTE — CARE PLAN
Problem: Fall/Injury  Goal: Not fall by end of shift  Outcome: Progressing  Goal: Be free from injury by end of the shift  Outcome: Progressing  Goal: Verbalize understanding of personal risk factors for fall in the hospital  Outcome: Progressing  Goal: Verbalize understanding of risk factor reduction measures to prevent injury from fall in the home  Outcome: Progressing  Goal: Use assistive devices by end of the shift  Outcome: Progressing  Goal: Pace activities to prevent fatigue by end of the shift  Outcome: Progressing   The patient's goals for the shift include      The clinical goals for the shift include rates pain as <4 on a scale fom 0-10 by end of shift

## 2023-10-16 ENCOUNTER — APPOINTMENT (OUTPATIENT)
Dept: VASCULAR MEDICINE | Facility: HOSPITAL | Age: 43
DRG: 620 | End: 2023-10-16
Payer: COMMERCIAL

## 2023-10-16 ENCOUNTER — HOSPITAL ENCOUNTER (OUTPATIENT)
Dept: CARDIOLOGY | Facility: HOSPITAL | Age: 43
Discharge: HOME | DRG: 620 | End: 2023-10-16
Payer: COMMERCIAL

## 2023-10-16 VITALS
DIASTOLIC BLOOD PRESSURE: 76 MMHG | RESPIRATION RATE: 18 BRPM | OXYGEN SATURATION: 93 % | TEMPERATURE: 97.5 F | BODY MASS INDEX: 44.41 KG/M2 | HEIGHT: 68 IN | SYSTOLIC BLOOD PRESSURE: 135 MMHG | WEIGHT: 293 LBS | HEART RATE: 86 BPM

## 2023-10-16 PROBLEM — E03.9 HYPOTHYROIDISM: Status: ACTIVE | Noted: 2020-02-12

## 2023-10-16 LAB
ATRIAL RATE: 95 BPM
P AXIS: 9 DEGREES
P OFFSET: 177 MS
P ONSET: 123 MS
PR INTERVAL: 184 MS
Q ONSET: 215 MS
QRS COUNT: 16 BEATS
QRS DURATION: 82 MS
QT INTERVAL: 368 MS
QTC CALCULATION(BAZETT): 462 MS
QTC FREDERICIA: 428 MS
R AXIS: -2 DEGREES
T AXIS: 39 DEGREES
T OFFSET: 399 MS
VENTRICULAR RATE: 95 BPM

## 2023-10-16 PROCEDURE — 93970 EXTREMITY STUDY: CPT

## 2023-10-16 PROCEDURE — 93970 EXTREMITY STUDY: CPT | Performed by: SURGERY

## 2023-10-16 PROCEDURE — 2500000001 HC RX 250 WO HCPCS SELF ADMINISTERED DRUGS (ALT 637 FOR MEDICARE OP): Performed by: STUDENT IN AN ORGANIZED HEALTH CARE EDUCATION/TRAINING PROGRAM

## 2023-10-16 PROCEDURE — 96372 THER/PROPH/DIAG INJ SC/IM: CPT | Performed by: STUDENT IN AN ORGANIZED HEALTH CARE EDUCATION/TRAINING PROGRAM

## 2023-10-16 PROCEDURE — 2500000004 HC RX 250 GENERAL PHARMACY W/ HCPCS (ALT 636 FOR OP/ED): Performed by: STUDENT IN AN ORGANIZED HEALTH CARE EDUCATION/TRAINING PROGRAM

## 2023-10-16 PROCEDURE — 93005 ELECTROCARDIOGRAM TRACING: CPT

## 2023-10-16 RX ORDER — FUROSEMIDE 20 MG/1
20 TABLET ORAL ONCE
Status: COMPLETED | OUTPATIENT
Start: 2023-10-16 | End: 2023-10-16

## 2023-10-16 RX ORDER — ADHESIVE BANDAGE
30 BANDAGE TOPICAL ONCE
Status: COMPLETED | OUTPATIENT
Start: 2023-10-16 | End: 2023-10-16

## 2023-10-16 RX ADMIN — SERTRALINE HYDROCHLORIDE 50 MG: 50 TABLET ORAL at 09:07

## 2023-10-16 RX ADMIN — LEVOTHYROXINE SODIUM 50 MCG: 0.05 TABLET ORAL at 05:50

## 2023-10-16 RX ADMIN — FUROSEMIDE 20 MG: 20 TABLET ORAL at 11:10

## 2023-10-16 RX ADMIN — ACETAMINOPHEN 650 MG: 160 SOLUTION ORAL at 05:50

## 2023-10-16 RX ADMIN — HEPARIN SODIUM 5000 UNITS: 5000 INJECTION INTRAVENOUS; SUBCUTANEOUS at 05:50

## 2023-10-16 RX ADMIN — SIMETHICONE 80 MG: 80 TABLET, CHEWABLE ORAL at 09:08

## 2023-10-16 RX ADMIN — MAGNESIUM HYDROXIDE 30 ML: 400 SUSPENSION ORAL at 11:31

## 2023-10-16 NOTE — PATIENT INSTRUCTIONS
You are doing great!   You may shower, let soap and water run over incisions, pat dry. Things will get easier over time.   Remember to increase your fluids and protein goals   Do not eat and drink at the same time.   Advance your diet to full liquids/pureed foods according to the diet guidelines.   See the dietician as must as you need too.   Take your omeprazole, open the capsule and sprinkle onto applesauce. Do not swallow whole.   Take your Multivitamin, B12 and calcium tablets.   Follow-up in 5 weeks for your 6 week post op visit.

## 2023-10-16 NOTE — DISCHARGE INSTRUCTIONS
PLEASE NOTE THE FOLLOWING CHANGES IN YOUR MEDICATION REGIMEN:    If you are diabetic:  - please check your blood sugar daily  - If your blood glucose is repeatedly over 200, start taking Metformin 500mg twice a day and contact your PCP to help guide your future management of your blood glucose  - If your blood glucose is less than 60 or you have symptoms of hypoglycemia (light headed, weak), take glucose or drink a cup of juice. If this happens repeatedly, stop all diabetes medication and contact your PCP or endocrinologist.    Medications:  - Medications should be crushed and mixed with full liquids. Capsules may be opened and mixed with full liquids.  - Extended release medications should not be taken. Please contact your primary care physician to convert extended release medications to immediate release form.   - Take 650mg Tylenol (liquid preferred) every 6 hours for pain. Take between doses of your opioid pain medication. Try to limit the use of your opioid pain medication and only take as needed.  - Slowly add your vitamins to your daily medication regimen as tolerated. You do not have to take them within the first few days after surgery if they are causing you nausea or other problems.   - If you have medications that you still cannot tolerate by your first visit with your surgeon or dietitian, please discuss this.   - You should be receiving or already have received the following medications for your postoperative course: a proton pump inhibitor for acid suppression (omeprazole, esomeprazole, pantoprazole), antinausea medication (ondansetron, metoclopramide), and pain medication (oxycodone, morphine, hydromorphone, hydrocodone). If you are missing any of these, please call the office immediately so we can prescribe them for you.  - OPEN UP OMEPRAZOLE CAPSULES AND SPRINKLE THEM IN WATER PRIOR TO TAKING. IF YOU HAD BARIATRIC SURGERY, YOU WILL CONTINUE THIS MEDICATION FOR AT LEAST 6 MONTHS.      Constipation  -  Take fiber (benefiber or metamucil) twice daily. Please also take colace (docusate) twice a day while taking oxycodone or other opiates. If you remain constipated despite these medications, please take milk of magnesia and call the office to notify us.      Activity instructions:   - No lifting, pulling, or pushing objects greater than 15 pounds for 4 weeks.  - Activity otherwise as tolerated.   - You may shower. Soap and water may run over your incisions. Pat dry. No submerging in baths or  swimming (activities that keep your incisions underwater) for at least two weeks.    - You may not drive while taking narcotics.     Diet:   - You will go home on a full liquid diet (similar to the diet you were on your final day in the hospital)  - You will stay on this full liquid diet for two weeks. Acceptable full liquids include protein shakes, sugar free jello, sugar free pudding, and creamy soups (no chunks). You will continue to drink clear liquids including water and crystal light. You should aim for 64 ounces of fluid per day, with about half of this being full liquids and half of this being clear liquids. Do not exceed 8 oz per hour.  - After two weeks, you should have a discussion with the dietician about progressing to a combination of full liquid and pureed foods.   - Follow a healthy bariatric diet. Target 5 meals per day (3 mid size meals and two small meals). Avoid concentrated sweets (candy, soda) and limit carbohydrate intake with a preference for healthy proteins (lean meats, beans  - For further information, follow the diet instructions listed in your bariatric surgery instruction packet.     Call Provider If:  - Breathing faster or harder than normal.   - Fever of 100.4 F (38 C) or higher or feeling of chills.   - Feeling very sleepy and difficult to awaken.   - Inability to drink or significant decrease in ability to drink since discharge.   - Vomiting (throwing up) and not able to eat or drink for 12  hours.   - Urinating much less than your normal.  - More than 4 loose, watery bowel movements in 24 hours (diarrhea).   - Any new concerning symptoms.

## 2023-10-16 NOTE — PROGRESS NOTES
"Meli Tsang is a 43 y.o. female on day 4 of admission presenting with Obesity.    Subjective   Still on 4L of oxygen. Tolerating 8oz an hour consistently. No fever/chills. Abdominal pain improving. Has developed bilateral lower extremity swelling. She denied active SOB except when walking.        Objective     Physical Exam  Constitutional:       Appearance: Normal appearance. She is obese.   HENT:      Head: Normocephalic and atraumatic.      Nose: Nose normal.      Mouth/Throat:      Mouth: Mucous membranes are moist.   Eyes:      Extraocular Movements: Extraocular movements intact.      Pupils: Pupils are equal, round, and reactive to light.   Cardiovascular:      Rate and Rhythm: Normal rate and regular rhythm.   Pulmonary:      Effort: Pulmonary effort is normal.   Abdominal:      General: There is distension (mild).      Palpations: Abdomen is soft.      Tenderness: There is abdominal tenderness (appropriate incisional).      Comments: Incisions c/d/I.   Musculoskeletal:         General: Normal range of motion.      Cervical back: Normal range of motion and neck supple.   Skin:     General: Skin is warm and dry.  Bilateral lower extremity with 2+ pitting edema.      Capillary Refill: Capillary refill takes less than 2 seconds.   Neurological:      General: No focal deficit present.      Mental Status: She is alert.   Psychiatric:         Mood and Affect: Mood normal.         Behavior: Behavior normal.         Thought Content: Thought content normal.         Judgment: Judgment normal.   Last Recorded Vitals  Blood pressure 126/64, pulse 93, temperature 36.2 °C (97.2 °F), temperature source Temporal, resp. rate 18, height 1.727 m (5' 8\"), weight 150 kg (330 lb 11 oz), SpO2 98 %.  Intake/Output last 3 Shifts:  I/O last 3 completed shifts:  In: - (0 mL/kg)   Out: 475 (3.2 mL/kg) [Urine:475 (0.1 mL/kg/hr)]  Weight: 150 kg     Results for orders placed or performed during the hospital encounter of 10/11/23 (from " the past 24 hour(s))   CBC and Auto Differential   Result Value Ref Range    WBC 9.9 4.4 - 11.3 x10*3/uL    nRBC 0.0 0.0 - 0.0 /100 WBCs    RBC 3.55 (L) 4.00 - 5.20 x10*6/uL    Hemoglobin 10.0 (L) 12.0 - 16.0 g/dL    Hematocrit 32.2 (L) 36.0 - 46.0 %    MCV 91 80 - 100 fL    MCH 28.2 26.0 - 34.0 pg    MCHC 31.1 (L) 32.0 - 36.0 g/dL    RDW 14.6 (H) 11.5 - 14.5 %    Platelets 303 150 - 450 x10*3/uL    MPV 9.8 7.5 - 11.5 fL    Neutrophils % 69.8 40.0 - 80.0 %    Immature Granulocytes %, Automated 0.3 0.0 - 0.9 %    Lymphocytes % 18.4 13.0 - 44.0 %    Monocytes % 7.8 2.0 - 10.0 %    Eosinophils % 3.4 0.0 - 6.0 %    Basophils % 0.3 0.0 - 2.0 %    Neutrophils Absolute 6.91 1.20 - 7.70 x10*3/uL    Immature Granulocytes Absolute, Automated 0.03 0.00 - 0.70 x10*3/uL    Lymphocytes Absolute 1.82 1.20 - 4.80 x10*3/uL    Monocytes Absolute 0.77 0.10 - 1.00 x10*3/uL    Eosinophils Absolute 0.34 0.00 - 0.70 x10*3/uL    Basophils Absolute 0.03 0.00 - 0.10 x10*3/uL   Basic Metabolic Panel   Result Value Ref Range    Glucose 108 (H) 74 - 99 mg/dL    Sodium 140 136 - 145 mmol/L    Potassium 3.8 3.5 - 5.3 mmol/L    Chloride 100 98 - 107 mmol/L    Bicarbonate 34 (H) 21 - 32 mmol/L    Anion Gap 10 10 - 20 mmol/L    Urea Nitrogen 6 6 - 23 mg/dL    Creatinine 0.57 0.50 - 1.05 mg/dL    eGFR >90 >60 mL/min/1.73m*2    Calcium 8.5 (L) 8.6 - 10.3 mg/dL              Assessment/Plan   Principal Problem:    Obesity      43F s/p robotic bypass at goal with PO intake but now requiring oxygen.   CT negative for PE.     -continue bariatric FLD  -Stop IVF. Give lasix 20 IV x1. CXR. Bilateral lower extremity venous duplex  -Pain and nausea control PRN  -SQH , SCDs, and ambulation. Encouraged use of IS.    Maritza Hill MD

## 2023-10-16 NOTE — PROGRESS NOTES
10/16/23 1124   Discharge Planning   Living Arrangements Spouse/significant other   Support Systems Spouse/significant other   Type of Residence Private residence   Patient expects to be discharged to: home   Does the patient need discharge transport arranged? No     Patient medically stable for discharge, Patient denies any home going needs, [facility].  Sherman Benjamin RN

## 2023-10-16 NOTE — DISCHARGE SUMMARY
Discharge Diagnosis  Obesity      Hospital Course     43F s/p robotic bypass at goal with PO intake. Extended stay in the hospital due to hypoxia and lower extremity swelling with CT negative for PE. + atelectasis. Negative lower extremity duplex. She improved with one dose of lasix 20mg. She is at goal in terms of PO intake, ambulating, voiding spontaneously without issues.     Pertinent Physical Exam At Time of Discharge  Physical Exam  Constitutional:       Appearance: Normal appearance. She is obese.   HENT:      Head: Normocephalic and atraumatic.      Nose: Nose normal.      Mouth/Throat:      Mouth: Mucous membranes are moist.   Eyes:      Extraocular Movements: Extraocular movements intact.      Pupils: Pupils are equal, round, and reactive to light.   Cardiovascular:      Rate and Rhythm: Normal rate and regular rhythm.   Pulmonary:      Effort: Pulmonary effort is normal.   Abdominal:      General: There is no distention.     Palpations: Abdomen is soft.      Tenderness: There is abdominal tenderness (appropriate incisional).      Comments: Incisions c/d/I. Binder on.  Musculoskeletal:         General: Normal range of motion.      Cervical back: Normal range of motion and neck supple.   Skin:     General: Skin is warm and dry.  Bilateral lower extremity with 1+ pitting edema.      Capillary Refill: Capillary refill takes less than 2 seconds.   Neurological:      General: No focal deficit present.      Mental Status: She is alert.   Psychiatric:         Mood and Affect: Mood normal.         Behavior: Behavior normal.         Thought Content: Thought content normal.         Judgment: Judgment normal.   Home Medications     Medication List      CONTINUE taking these medications     alcohol swabs pads, medicated   Sharps Container; Generic drug: sharps container     ASK your doctor about these medications     B12 ACTIVE ORAL   enoxaparin 60 mg/0.6 mL syringe; Commonly known as: Lovenox   FISH OIL ORAL    hydroCHLOROthiazide 25 mg tablet; Commonly known as: HYDRODiuril   levothyroxine 50 mcg tablet; Commonly known as: Synthroid, Levoxyl   Mirena 21 mcg/24 hours (8 yrs) 52 mg IUD; Generic drug: levonorgestrel   MULTIPLE VITAMINS ORAL   NON FORMULARY   omeprazole 40 mg DR capsule; Commonly known as: PriLOSEC   ondansetron ODT 4 mg disintegrating tablet; Commonly known as:   Zofran-ODT   rosuvastatin 40 mg tablet; Commonly known as: Crestor   sertraline 50 mg tablet; Commonly known as: Zoloft       Outpatient Follow-Up  Future Appointments   Date Time Provider Department [place]   10/19/2023  8:15 AM Madison Parks RDN, DAMIAN [id number] [place]   10/19/2023  8:45 AM MD NANDINI [id number] [place]   11/20/2023 10:30 AM Madison Parks RDN, LD [id number] [place]   11/20/2023 11:00 AM OSMIN [id number] [place]   1/8/2024  8:30 AM OSMIN [id number] [place]   1/8/2024  9:00 AM Madison Parks RDN, DAMIAN [id number] [place]       Maritza Hill MD

## 2023-10-16 NOTE — PROGRESS NOTES
BARIATRIC SURGERY CLINIC  FOLLOW UP NOTE      Name: Meli Tsang  MRN: [id number]      Index Surgery  Date of Surgery: 10/11/2023   Surgeon: Dr. Jacinto Weiss   Surgical Procedure: Laparoscopic zulay en y gastric bypass 15600  Initial weight: 332 lbs  Pre-surgical weight: 338 lbs    Visit: 1 week post op   Today's Visit:   Wt Readings from Last 1 Encounters:   10/19/23 (!) 150 kg (331 lb)    Body mass index is 50.33 kg/m².   Last Visit:    Wt Readings from Last 3 Encounters:   10/19/23 (!) 150 kg (331 lb)   10/11/23 150 kg (330 lb 11 oz)   02/06/23 (!) 150 kg (331 lb)     HPI: 43 year old female presenting today for 1 week post op follow up. S/p robotic assisted zulay-en-Y gastric bypass. Discharged POD5. Extended stay in the hospital due to hypoxia and lower extremity swelling with CT negative for PE. + atelectasis. Negative lower extremity duplex. She improved with one dose of lasix 20mg. She is at goal in terms of PO intake, ambulating, voiding spontaneously without issues.          DIET INTAKE: Liquid    Diet History:   Carbonated Beverages: No  Fluid intake: 60 oz/day  Breakfast: protein shake, sf pudding   Lunch: blended soup   Dinner: 1/2 protein shake, sf applesauce   Snacks: none  Protein 30-60g    DAILY SUPPLEMENTS:  Calcium: Calcium Citrate w/ vitamin D (1200 - 1500mg)  Multivitamin & Minerals: 2 per day  Iron Supplement: included in multi-vitamin  Vitamin B12: 1000 mcg  PPI: Omeprazole 40mg qd    EXERCISE: Walking    Symptoms:   Appetite lost: Y   Experiencing hunger: N   Nausea/vomiting: N   Food intolerance: N   Constipation: No reports soft BM    Diarrhea: no   Experiencing dumping: N   Abdominal pain: 3/10 improving daily    Reflux: Yes, chest pain when drinking too much/too fast  Are you on medications: Omeprazole      Current Outpatient Medications   Medication Sig Dispense Refill    alcohol swabs pads, medicated WIPE AREA FOR 15 SECONDS AND ALLOW TO DRY PRIOR TO PRIOR TO INJECTION ENOXAPARIN       docosahexaenoic acid/epa (FISH OIL ORAL) Take 1 capsule by mouth once daily.      enoxaparin (Lovenox) 60 mg/0.6 mL syringe Inject 0.6 mL (60 mg) under the skin every 28 (twenty-eight) days.  POST OP. ROTATE INJECTION SITES      hydroCHLOROthiazide (HYDRODiuril) 25 mg tablet Take 1 tablet (25 mg) by mouth once daily.      levonorgestrel (Mirena) 21 mcg/24 hours (8 yrs) 52 mg IUD 52 mg by intrauterine route 1 time.      levothyroxine (Synthroid, Levoxyl) 50 mcg tablet Take 1 tablet (50 mcg) by mouth once daily. As directed      mecobalamin (B12 ACTIVE ORAL) Take 1 tablet by mouth once daily. As directeddd      multivitamin (MULTIPLE VITAMINS ORAL) Take 1 tablet by mouth once daily.      NON FORMULARY Ocycodone hcl solution 5mg/5ml take 5ml every 6 hrs as needed for severe pain alternat with tylenol      omeprazole (PriLOSEC) 40 mg DR capsule OPEN CAPSULE AND SPRINKLE ON SUGAR FREE PUDDING OR APPLESAUCE AND TAKE BY MOUTH ONCE DAILY. DO NOT CRUSH OR CHEW BEADS.      ondansetron ODT (Zofran-ODT) 4 mg disintegrating tablet Take 1-2 tablets (4-8 mg) by mouth. EVERY 6 TO 8 HOURS AS NEEDED FOR NAUSEA      rosuvastatin (Crestor) 40 mg tablet Take 1 tablet (40 mg) by mouth once daily.      sertraline (Zoloft) 50 mg tablet Take 1 tablet (50 mg) by mouth once daily.      Sharps Container 1 each. USE AFTER INJECTIONS       No current facility-administered medications for this visit.       Comorbidities:  No problem-specific Assessment & Plan notes found for this encounter.        REVIEW OF SYSTEMS:  CONSTITUTIONAL: Patient denies fevers, chills, sweats and weight changes.  EYES: Patient denies any visual symptoms.  EARS, NOSE, AND THROAT: No difficulties with hearing. No symptoms of rhinitis or sore throat.  CARDIOVASCULAR: Patient denies chest pains, palpitations, orthopnea and paroxysmal nocturnal dyspnea.  RESPIRATORY: No dyspnea on exertion, no wheezing or cough.  GI: No nausea, vomiting, diarrhea, constipation, abdominal  "pain, hematochezia or melena.  : No urinary hesitancy or dribbling. No nocturia or urinary frequency. No abnormal urethral discharge.  MUSCULOSKELETAL: No myalgias or arthralgias.  NEUROLOGIC: No chronic headaches, no seizures. Patient denies numbness, tingling or weakness.  PSYCHIATRIC: Patient denies problems with mood disturbance. No problems with anxiety.  ENDOCRINE: No excessive urination or excessive thirst.  DERMATOLOGIC: Patient denies any rashes or skin changes.    PHYSICAL EXAM:  /78 (BP Location: Left arm, Patient Position: Sitting, BP Cuff Size: Large adult long)   Pulse 88   Temp 36.3 °C (97.3 °F) (Oral)   Ht 1.727 m (5' 8\")   Wt (!) 150 kg (331 lb)   LMP  (LMP Unknown)   SpO2 97%   BMI 50.33 kg/m²   GENERAL: Obese. No apparent distress. Pt is alert and oriented x3.  HEENT: Mucous membranes are moist. Posterior pharynx clear of any exudate or lesions.  LUNGS: Clear to auscultation.  HEART: Regular rate and rhythm without murmur.  ABDOMEN: Soft, nontender, and nondistended. Positive bowel sounds. Incisions healing well  EXTREMITIES: minimal edema at ankles.  NEUROLOGIC:  grossly intact.  PSYCHIATRIC: Flat affect, but denies suicidal or homicidal ideations.  SKIN: No ulceration or induration present.      A/P: Normal post-OP course    No sign of infection, Doing well, Fair pain control, and tolerating diet    Energy: Energy good    Weight loss: Steady    Recommendations: No heavy lifting > 10 lbs for: 4 weeks, Fluid intake 60 oz/day, Protein 60 g/day, Plan your meals, Do not skip meals, Follow pouch rules, Set Alarms, Continue taking vitamins as directed., and Remember to take your: omeprazole daily.    Follow up:  5 weeks            "

## 2023-10-18 NOTE — DOCUMENTATION CLARIFICATION NOTE
PATIENT:               RICKEY BECK  ACCT #:                  [id number]  MRN:                       [id number]  :                       1980  ADMIT DATE:       10/11/2023 10:26 AM  DISCH DATE:        10/16/2023 12:24 PM  RESPONDING PROVIDER #:        [id number]          PROVIDER RESPONSE TEXT:    Morbid obesity with hypoventilation syndrome    CDI QUERY TEXT:    [facility]_BMI High        Instruction:    Based on your assessment of the patient and the clinical information, please provide the requested documentation by clicking on the appropriate radio button and enter any additional information if prompted.    Question: Is there a diagnosis associated with a BMI of CDI TO ENTER appropriate for this patient    When answering this query, please exercise your independent professional judgment. The fact that a question is being asked, does not imply that any particular answer is desired or expected.    The patient's clinical indicators include:  Clinical Information: 43 y.o. woman with BMI over 45 and DM2 presented for bariatric surgery    Clinical Indicators: Obesity documented. YONAS documented.    Treatment: CPAP , bariatric surgery  Options provided:  -- Morbid obesity with hypoventilation syndrome  -- Other - I will add my own diagnosis  -- Refer to Clinical Documentation Reviewer    Query created by: Cherelle Jamil on 10/18/2023 7:33 AM      Electronically signed by:  ESAU DHALIWAL MD 10/18/2023 8:07 AM

## 2023-10-19 ENCOUNTER — OFFICE VISIT (OUTPATIENT)
Dept: SURGERY | Facility: CLINIC | Age: 43
End: 2023-10-19
Payer: COMMERCIAL

## 2023-10-19 ENCOUNTER — NUTRITION (OUTPATIENT)
Dept: SURGERY | Facility: CLINIC | Age: 43
End: 2023-10-19
Payer: COMMERCIAL

## 2023-10-19 VITALS
TEMPERATURE: 97.3 F | SYSTOLIC BLOOD PRESSURE: 117 MMHG | WEIGHT: 293 LBS | HEART RATE: 88 BPM | BODY MASS INDEX: 44.41 KG/M2 | DIASTOLIC BLOOD PRESSURE: 78 MMHG | HEIGHT: 68 IN | OXYGEN SATURATION: 97 %

## 2023-10-19 DIAGNOSIS — Z98.84 BARIATRIC SURGERY STATUS: ICD-10-CM

## 2023-10-19 DIAGNOSIS — E66.01 MORBID OBESITY WITH BMI OF 50.0-59.9, ADULT (MULTI): ICD-10-CM

## 2023-10-19 PROCEDURE — 3074F SYST BP LT 130 MM HG: CPT | Performed by: SURGERY

## 2023-10-19 PROCEDURE — 3078F DIAST BP <80 MM HG: CPT | Performed by: SURGERY

## 2023-10-19 PROCEDURE — 99024 POSTOP FOLLOW-UP VISIT: CPT | Performed by: SURGERY

## 2023-10-19 PROCEDURE — 1036F TOBACCO NON-USER: CPT | Performed by: SURGERY

## 2023-10-19 ASSESSMENT — PAIN SCALES - GENERAL: PAINLEVEL: 3

## 2023-10-20 ENCOUNTER — APPOINTMENT (OUTPATIENT)
Dept: RADIOLOGY | Facility: HOSPITAL | Age: 43
End: 2023-10-20
Payer: COMMERCIAL

## 2023-10-20 ENCOUNTER — HOSPITAL ENCOUNTER (EMERGENCY)
Facility: HOSPITAL | Age: 43
Discharge: HOME | End: 2023-10-20
Payer: COMMERCIAL

## 2023-10-20 VITALS
SYSTOLIC BLOOD PRESSURE: 133 MMHG | TEMPERATURE: 96.2 F | DIASTOLIC BLOOD PRESSURE: 83 MMHG | HEART RATE: 76 BPM | RESPIRATION RATE: 18 BRPM | OXYGEN SATURATION: 95 % | BODY MASS INDEX: 44.41 KG/M2 | WEIGHT: 293 LBS | HEIGHT: 68 IN

## 2023-10-20 DIAGNOSIS — Z98.84 STATUS POST BARIATRIC SURGERY: Primary | ICD-10-CM

## 2023-10-20 LAB
ALBUMIN SERPL BCP-MCNC: 3.9 G/DL (ref 3.4–5)
ALP SERPL-CCNC: 61 U/L (ref 33–110)
ALT SERPL W P-5'-P-CCNC: 28 U/L (ref 7–45)
ANION GAP SERPL CALC-SCNC: 14 MMOL/L (ref 10–20)
AST SERPL W P-5'-P-CCNC: 23 U/L (ref 9–39)
BASOPHILS # BLD AUTO: 0.04 X10*3/UL (ref 0–0.1)
BASOPHILS NFR BLD AUTO: 0.5 %
BILIRUB SERPL-MCNC: 0.5 MG/DL (ref 0–1.2)
BUN SERPL-MCNC: 15 MG/DL (ref 6–23)
CALCIUM SERPL-MCNC: 9.4 MG/DL (ref 8.6–10.3)
CHLORIDE SERPL-SCNC: 93 MMOL/L (ref 98–107)
CO2 SERPL-SCNC: 33 MMOL/L (ref 21–32)
CREAT SERPL-MCNC: 0.56 MG/DL (ref 0.5–1.05)
EOSINOPHIL # BLD AUTO: 0.2 X10*3/UL (ref 0–0.7)
EOSINOPHIL NFR BLD AUTO: 2.3 %
ERYTHROCYTE [DISTWIDTH] IN BLOOD BY AUTOMATED COUNT: 14.4 % (ref 11.5–14.5)
GFR SERPL CREATININE-BSD FRML MDRD: >90 ML/MIN/1.73M*2
GLUCOSE SERPL-MCNC: 85 MG/DL (ref 74–99)
HCT VFR BLD AUTO: 35.7 % (ref 36–46)
HGB BLD-MCNC: 11.4 G/DL (ref 12–16)
IMM GRANULOCYTES # BLD AUTO: 0.13 X10*3/UL (ref 0–0.7)
IMM GRANULOCYTES NFR BLD AUTO: 1.5 % (ref 0–0.9)
LIPASE SERPL-CCNC: 13 U/L (ref 9–82)
LYMPHOCYTES # BLD AUTO: 1.98 X10*3/UL (ref 1.2–4.8)
LYMPHOCYTES NFR BLD AUTO: 22.9 %
MCH RBC QN AUTO: 28.4 PG (ref 26–34)
MCHC RBC AUTO-ENTMCNC: 31.9 G/DL (ref 32–36)
MCV RBC AUTO: 89 FL (ref 80–100)
MONOCYTES # BLD AUTO: 0.75 X10*3/UL (ref 0.1–1)
MONOCYTES NFR BLD AUTO: 8.7 %
NEUTROPHILS # BLD AUTO: 5.55 X10*3/UL (ref 1.2–7.7)
NEUTROPHILS NFR BLD AUTO: 64.1 %
NRBC BLD-RTO: 0 /100 WBCS (ref 0–0)
PLATELET # BLD AUTO: 491 X10*3/UL (ref 150–450)
PMV BLD AUTO: 9 FL (ref 7.5–11.5)
POTASSIUM SERPL-SCNC: 3.8 MMOL/L (ref 3.5–5.3)
PROT SERPL-MCNC: 6.7 G/DL (ref 6.4–8.2)
RBC # BLD AUTO: 4.02 X10*6/UL (ref 4–5.2)
SODIUM SERPL-SCNC: 136 MMOL/L (ref 136–145)
WBC # BLD AUTO: 8.7 X10*3/UL (ref 4.4–11.3)

## 2023-10-20 PROCEDURE — 83690 ASSAY OF LIPASE: CPT | Performed by: NURSE PRACTITIONER

## 2023-10-20 PROCEDURE — 2550000001 HC RX 255 CONTRASTS: Performed by: RADIOLOGY

## 2023-10-20 PROCEDURE — 74177 CT ABD & PELVIS W/CONTRAST: CPT | Mod: FOREIGN READ | Performed by: RADIOLOGY

## 2023-10-20 PROCEDURE — 85025 COMPLETE CBC W/AUTO DIFF WBC: CPT | Performed by: NURSE PRACTITIONER

## 2023-10-20 PROCEDURE — 36415 COLL VENOUS BLD VENIPUNCTURE: CPT | Performed by: NURSE PRACTITIONER

## 2023-10-20 PROCEDURE — 80053 COMPREHEN METABOLIC PANEL: CPT | Performed by: NURSE PRACTITIONER

## 2023-10-20 PROCEDURE — 99284 EMERGENCY DEPT VISIT MOD MDM: CPT | Mod: 25

## 2023-10-20 PROCEDURE — 74177 CT ABD & PELVIS W/CONTRAST: CPT | Mod: FR

## 2023-10-20 RX ORDER — GABAPENTIN 300 MG/1
300 CAPSULE ORAL 3 TIMES DAILY
Qty: 42 CAPSULE | Refills: 0 | Status: SHIPPED | OUTPATIENT
Start: 2023-10-20 | End: 2023-11-03

## 2023-10-20 RX ORDER — TRIAMCINOLONE ACETONIDE 40 MG/ML
10 INJECTION, SUSPENSION INTRA-ARTICULAR; INTRAMUSCULAR ONCE
Status: DISCONTINUED | OUTPATIENT
Start: 2023-10-20 | End: 2023-10-20 | Stop reason: HOSPADM

## 2023-10-20 RX ORDER — LIDOCAINE HYDROCHLORIDE 10 MG/ML
10 INJECTION, SOLUTION EPIDURAL; INFILTRATION; INTRACAUDAL; PERINEURAL ONCE
Status: DISCONTINUED | OUTPATIENT
Start: 2023-10-20 | End: 2023-10-20 | Stop reason: HOSPADM

## 2023-10-20 RX ORDER — BUPIVACAINE HYDROCHLORIDE 5 MG/ML
10 INJECTION, SOLUTION PERINEURAL ONCE
Status: DISCONTINUED | OUTPATIENT
Start: 2023-10-20 | End: 2023-10-20 | Stop reason: HOSPADM

## 2023-10-20 RX ADMIN — IOHEXOL 70 ML: 350 INJECTION, SOLUTION INTRAVENOUS at 17:35

## 2023-10-20 ASSESSMENT — ENCOUNTER SYMPTOMS
ENDOCRINE NEGATIVE: 1
PSYCHIATRIC NEGATIVE: 1
CARDIOVASCULAR NEGATIVE: 1
CONSTITUTIONAL NEGATIVE: 1
ABDOMINAL PAIN: 0
ABDOMINAL DISTENTION: 0
RESPIRATORY NEGATIVE: 1
GASTROINTESTINAL NEGATIVE: 1
ALLERGIC/IMMUNOLOGIC NEGATIVE: 1
DIARRHEA: 0
HEMATOLOGIC/LYMPHATIC NEGATIVE: 1
CONSTIPATION: 0
NAUSEA: 0
VOMITING: 0
EYES NEGATIVE: 1
MUSCULOSKELETAL NEGATIVE: 1
NEUROLOGICAL NEGATIVE: 1

## 2023-10-20 ASSESSMENT — COLUMBIA-SUICIDE SEVERITY RATING SCALE - C-SSRS
6. HAVE YOU EVER DONE ANYTHING, STARTED TO DO ANYTHING, OR PREPARED TO DO ANYTHING TO END YOUR LIFE?: NO
2. HAVE YOU ACTUALLY HAD ANY THOUGHTS OF KILLING YOURSELF?: NO
1. IN THE PAST MONTH, HAVE YOU WISHED YOU WERE DEAD OR WISHED YOU COULD GO TO SLEEP AND NOT WAKE UP?: NO

## 2023-10-20 NOTE — ED TRIAGE NOTES
Pt presents to ED c/o right sided abdominal pain that began during the night last night. Pt states pain intensifies with movement, is a hot, stabbing sensation. Pt had gastric bypass surgery last Wednesday and has not had any issues eating/ drinking. Denies n/v/d, CP, SOB.

## 2023-10-20 NOTE — ED PROVIDER NOTES
Limitations to History: None     HPI:      Meli Tsang is a 43 y.o.  female with significant past medical history for cholecystectomy and robotic laparoscopic bariatric surgery on 10/11 per Dr. Weiss with discharge home on 10/16.  Patient initially felt fine however yesterday the patient had mild pain above the right sided stab wound on her abdomen which is gotten progressively worse.  Sharp stabbing pain is exacerbated with any movements.  Despite the symptoms the patient is eating/drinking, voiding and having normal bowel movements.  No improvement with Tylenol and oxycodone at home.  Denies fever/chills, cough/cold symptoms, chest pain, shortness of breath, nausea/vomiting, dysuria/hematuria, change in bowel habits or any other complaints.  No smoking, EtOH or drug use.    Additional History Obtained from: None    ------------------------------------------------------------------------------------------------------------------------------------------    VS: As documented in the triage note and EMR flowsheet from this visit were reviewed.    Physical Exam:  Gen: Pleasant middle-age  female, sitting in wheelchair, nontoxic looking, alert and oriented x3.  Well-hydrated and nourished.  Head/Neck: NCAT, neck w/ FROM  Eyes: EOMI, PERRL, anicteric sclerae, noninjected conjunctivae  Ears: TMs clear b/l without sign of infection  Nose: Nares patent w/o rhinorrhea  Mouth:  MMM, no OP lesions noted  Heart: RRR no MRG  Lungs: CTA b/l no RRW, no increased work of breathing  Abdomen: Obese and soft with bowel sounds, tender in the right side of the abdomen.  ND, no HSM, no palpable masses  Musculoskeletal: Movement of extremities x4, MSPs intact.  Skin intact.  No deformities.  Neurologic: No focal neurological deficits.  Symmetrical facies, phonates clearly, moves all extremities equally, responsive to touch, ambulates normally   Skin: 4 stab wounds on the abdomen are intact without erythema or drainage.   Skin Pink warm and dry.        ------------------------------------------------------------------------------------------------------------------------------------------    Medical Decision Making:     ED Course as of 10/20/23 1839   Fri Oct 20, 2023   1805 43-year-old  female with history of cholecystectomy and recent robotic laparoscopic bariatric surgery on 11/10 is evaluated at the bedside for right-sided abdominal pain.  I examined the patient in triage, IV established, basic labs were ordered and CT abdomen/pelvis with IV and oral contrast will be performed.  Laboratory studies are reviewed, no leukocytosis or evidence of anemia.  Normal kidney function, electrolytes, LFTs and lipase.  UA is still pending.  I spoke with the fellow for bariatric surgery Dr. Hill, she came to the bedside and did a local injection with lidocaine, bupivacaine and Kenalog for pain control.  Patient is much more comfortable, she is able to ambulate to the bathroom.  Will wait for CT results however we anticipate that will be normal and the patient will likely be discharged home.   [SB]   1833 CT shows no acute intra-abdominal process.  Some mild wall thickening and adjacent edema remain in small bowel loops in the left midabdomen. This is improved when compared to the prior CT and there is no longer any adjacent free fluid.  Repeat vital signs within normal limits.  Patient remains comfortable.  Discharge home, follow-up with bariatric surgery as scheduled.  Resume all normal postop orders.  Gabapentin 300 mg every 8 hours is added to medication regiment, sent to pharmacy.  Return precautions discussed.  Diagnosis, treatment and plan discussed with patient, she verbalizes understanding and is in agreement.  Condition stable for discharge.   [SB]      ED Course User Index  [SB] Monique Diaz, APRN-CNP         Diagnoses as of 10/20/23 1839   Status post bariatric surgery       EKG not ordered    Chronic Medical Conditions  Significantly Affecting Care: None    External Records Reviewed: None    Discussion of Management with Other Providers: Dr. Hill as above         Monique Diaz, APRN-CNP  10/20/23 2913

## 2023-10-20 NOTE — ED TRIAGE NOTES
PIT Note    Secondary to patient volumes and overcrowding, I performed a brief medical screening exam of the patient in triage, as the patient awaits space in the main ED.    History of Present Illness: Meli Tsang is a 43-year-old  female status post robotic laparoscopic bariatric surgery per Dr. Weiss on 10/11 who presents to ED today from home with  for right-sided abdominal pain.  Patient was progressing normally after surgery.  Discharged home 4 days ago.  Yesterday the patient had mild pain in the right lateral abdomen just above the stab wound that has gotten progressively worse.  Sharp stabbing pain is rated 7/10 with any type of movement despite the use of Tylenol and oxycodone.  Denies fever/chills, cough/cold symptoms, chest pain, shortness of breath, nausea/vomiting, dysuria/hematuria, change in bowel habits or any other complaints.  No smoking, EtOH or drug use.    Physical Exam:  General -pleasant middle-aged  female sitting in wheelchair, appears uncomfortable.  In moderate distress.  Nontoxic looking.  Alert and oriented x3.  Well-hydrated and nourished.  Respiratory - Breathing comfortably  Cardiac -regular rate and rhythm  Abdomen -obese, soft, tender in the right lateral region.  4 stab wounds intact without erythema or purulent drainage.  Neurologic -no focal neurological deficits.  Moving all extremities x4.  Skin -Pink warm and dry.      Medical Decision Makin-year-old  female 9 days status post robotic laparoscopic bariatric surgery per Dr. Weiss was convalescing normally but yesterday developed pain above the right lateral stab wound that has gotten progressively worse, no improvement with Tylenol and oxycodone.  On arrival to the ED, awake and alert, vital signs within normal limits.  Afebrile.  Abdomen is obese and soft, tender in the right lateral side just above the right sided stab wound, no evidence of infection associated with stab wounds.   Remains NPO.  IV established, basic labs and CT abdomen/pelvis with IV and oral contrast will be performed in preparation for further evaluation in the main ED.  Patient is agreeable to this plan.    The patient demonstrates understanding that this initial evaluation is a brief medical screening exam and the expectation is that they await for space in the main ED to be further evaluated.  The patient understands that, if they leave prior to further evaluation in the main ED after this initial evaluation in triage, they are doing so under their own accord knowing that their evaluation/work-up is not yet complete. The patient also understands that any preliminary diagnostic results, including abnormalities, may not be shared with them, if they choose to leave prior to further evaluation in the main ED.

## 2023-10-20 NOTE — CONSULTS
Reason For Consult  Abdominal pain    History Of Present Illness  Meli Tsang is a 43 y.o. female with recent gastric bypass with prolonged hospital stay for hypoxia, atelectasis and third spacing. She eventually improved and workup was negative. She was seen in clinic yesterday and was doing well. She came to the ED today due to right sided abdominal pain.   No fever/chills.   Labs are WNL.      Past Medical History  She has a past medical history of Other seasonal allergic rhinitis, Personal history of other diseases of the circulatory system, Personal history of other endocrine, nutritional and metabolic disease, Personal history of other mental and behavioral disorders, Personal history of other mental and behavioral disorders, PONV (postoperative nausea and vomiting), and S/P gastric bypass (10/11/2023).    Surgical History  She has a past surgical history that includes Cholecystectomy (12/20/2016); CT angio head w and wo IV contrast (07/12/2021); CT angio coronary art with heartflow if score >30% (09/08/2022); and Bariatric Surgery (10/11/2023).     Social History  She reports that she has quit smoking. Her smoking use included cigarettes. She has never used smokeless tobacco. She reports that she does not drink alcohol and does not use drugs.    Family History  Family History   Problem Relation Name Age of Onset    Other (perry on cpap) Father      Other (cva) Father      Diabetes Father      Other (cardiac disorder) Other grandparent         Allergies  Patient has no known allergies.    Review of Systems  Review of Systems   Constitutional: Negative.    HENT: Negative.     Eyes: Negative.    Respiratory: Negative.     Cardiovascular: Negative.    Gastrointestinal: Negative.  Negative for abdominal distention, abdominal pain, constipation, diarrhea, nausea and vomiting.   Endocrine: Negative.    Genitourinary: Negative.    Musculoskeletal: Negative.    Skin: Negative.    Allergic/Immunologic: Negative.   "  Neurological: Negative.    Hematological: Negative.    Psychiatric/Behavioral: Negative.           Physical Exam  Physical Exam  Vitals reviewed.   Constitutional:       Appearance: Normal appearance. She is obese.   HENT:      Head: Normocephalic and atraumatic.      Nose: Nose normal.      Mouth/Throat:      Mouth: Mucous membranes are moist.   Eyes:      Extraocular Movements: Extraocular movements intact.      Pupils: Pupils are equal, round, and reactive to light.   Cardiovascular:      Rate and Rhythm: Normal rate and regular rhythm.   Pulmonary:      Effort: Pulmonary effort is normal.   Abdominal:      General: There is no distension (mild).      Palpations: Abdomen is soft.      Tenderness: There is abdominal tenderness (mild right sided).      Comments: Incisions c/d/I.    Musculoskeletal:         General: Normal range of motion.      Cervical back: Normal range of motion and neck supple.   Skin:     General: Skin is warm and dry.      Capillary Refill: Capillary refill takes less than 2 seconds.   Neurological:      General: No focal deficit present.      Mental Status: She is alert.   Psychiatric:         Mood and Affect: Mood normal.         Behavior: Behavior normal.         Thought Content: Thought content normal.         Judgment: Judgment normal.           Last Recorded Vitals  Blood pressure 141/62, pulse 75, temperature 35.7 °C (96.2 °F), temperature source Oral, resp. rate 20, height 1.727 m (5' 8\"), weight 149 kg (328 lb), SpO2 93 %.    Relevant Results  Results for orders placed or performed during the hospital encounter of 10/20/23 (from the past 24 hour(s))   CBC and Auto Differential   Result Value Ref Range    WBC 8.7 4.4 - 11.3 x10*3/uL    nRBC 0.0 0.0 - 0.0 /100 WBCs    RBC 4.02 4.00 - 5.20 x10*6/uL    Hemoglobin 11.4 (L) 12.0 - 16.0 g/dL    Hematocrit 35.7 (L) 36.0 - 46.0 %    MCV 89 80 - 100 fL    MCH 28.4 26.0 - 34.0 pg    MCHC 31.9 (L) 32.0 - 36.0 g/dL    RDW 14.4 11.5 - 14.5 %    " Platelets 491 (H) 150 - 450 x10*3/uL    MPV 9.0 7.5 - 11.5 fL    Neutrophils % 64.1 40.0 - 80.0 %    Immature Granulocytes %, Automated 1.5 (H) 0.0 - 0.9 %    Lymphocytes % 22.9 13.0 - 44.0 %    Monocytes % 8.7 2.0 - 10.0 %    Eosinophils % 2.3 0.0 - 6.0 %    Basophils % 0.5 0.0 - 2.0 %    Neutrophils Absolute 5.55 1.20 - 7.70 x10*3/uL    Immature Granulocytes Absolute, Automated 0.13 0.00 - 0.70 x10*3/uL    Lymphocytes Absolute 1.98 1.20 - 4.80 x10*3/uL    Monocytes Absolute 0.75 0.10 - 1.00 x10*3/uL    Eosinophils Absolute 0.20 0.00 - 0.70 x10*3/uL    Basophils Absolute 0.04 0.00 - 0.10 x10*3/uL   Comprehensive metabolic panel   Result Value Ref Range    Glucose 85 74 - 99 mg/dL    Sodium 136 136 - 145 mmol/L    Potassium 3.8 3.5 - 5.3 mmol/L    Chloride 93 (L) 98 - 107 mmol/L    Bicarbonate 33 (H) 21 - 32 mmol/L    Anion Gap 14 10 - 20 mmol/L    Urea Nitrogen 15 6 - 23 mg/dL    Creatinine 0.56 0.50 - 1.05 mg/dL    eGFR >90 >60 mL/min/1.73m*2    Calcium 9.4 8.6 - 10.3 mg/dL    Albumin 3.9 3.4 - 5.0 g/dL    Alkaline Phosphatase 61 33 - 110 U/L    Total Protein 6.7 6.4 - 8.2 g/dL    AST 23 9 - 39 U/L    Bilirubin, Total 0.5 0.0 - 1.2 mg/dL    ALT 28 7 - 45 U/L   Lipase   Result Value Ref Range    Lipase 13 9 - 82 U/L         Assessment/Plan     43F s/p gastric bypass with prolonged hospital stay for hypoxia and third spacing here for right sided abdominal pain.     -Exam is overall reassuring   -CT was reassuring  -Will give a local block at the site of pain    Maritza Hill MD  Bariatric/Foregut Fellow    Maritza Hill MD

## 2023-10-26 NOTE — DOCUMENTATION CLARIFICATION NOTE
PATIENT:               RICKEY BECK  ACCT #:                  [id number]  MRN:                       [id number]  :                       1980  ADMIT DATE:       10/11/2023 10:26 AM  DISCH DATE:        10/16/2023 12:24 PM  RESPONDING PROVIDER #:        [id number]          PROVIDER RESPONSE TEXT:    Metabolic syndrome is clinically significant and required treatment/monitoring    CDI QUERY TEXT:    [facility]_Abnormal Studies      Instruction:    Based on your assessment of the patient and the clinical information, please provide the requested documentation by clicking on the appropriate radio button and enter any additional information if prompted.    Question: Is there a diagnosis indicative of the lab values or image study    When answering this query, please exercise your independent professional judgment. The fact that a question is being asked, does not imply that any particular answer is desired or expected.    The patient's clinical indicators include:  Clinical Information:  Patient is a 43 y.o. women admitted for bariatric surgery.    Clinical Indicators: Obesity with BMI 49 documented. hypercholesterolemia, hypertension, diabetes, severe sleep apnea, obesity related hypoventilation, HDL 39.2 on pre-op testing    Treatment: Gastric bypass  Options provided:  -- Metabolic syndrome is clinically significant and required treatment/monitoring  -- Patient had obesity but not metabolic syndrome  -- Other - I will add my own diagnosis  -- Refer to Clinical Documentation Reviewer    Query created by: Cherelle Jamil on 10/25/2023 1:28 PM      Electronically signed by:  ESAU DHALIWAL MD 10/26/2023 9:42 AM

## 2023-11-03 ENCOUNTER — TELEPHONE (OUTPATIENT)
Dept: SURGERY | Facility: CLINIC | Age: 43
End: 2023-11-03
Payer: COMMERCIAL

## 2023-11-03 NOTE — TELEPHONE ENCOUNTER
From: Meli Tsang <[email or web address]>   Sent: Friday, November 3, 2023 10:58 AM  To: Daphne Olivia <[email or web address]>  Subject: Help!     So I'm getting all my protein in everyday but im only getting about half my water since i went back to work. I feel sick when i drink water on an empty stomach. And im super constipated but cant drink the water with fiber powder fast enough             Hi Meli,     For acute constipation, please use Milk of Magnesia to help you find some relief. You can take 1 ounce twice per day, or if you have not gone in several days, feel free to take the entire 2 ounces twice daily.     If you are experiencing pain when drinking plain water, please try things like decaffeinated tea, Gatorade zero, PowerAde zero, crystal lite, adding lemon to your water, changing the temperature of the liquid you are drinking (if cold hurts, try warm or hot and vice versa), etc.     Remember, the benefiber will only be helpful to you if you are meeting your 64 ounce minimum fluid goal.     Your dietitian is a great resource to help you trouble shoot issues with food, fluids or vitamins.  Please do not hesitate to reach out to them for further advice!     Please also sign up for MyChart if you have not done so already.  MyChart and or telephone will be the best way to communicate with our team moving forward.     Thanks!   Daphne Olivia, BSN, RN   Assistant Nurse Manager   Bariatric and Metabolic Surgery   [address] [place] [address]   P: [phone] F: [phone]  Available via Epic Secure Chat

## 2023-11-07 ENCOUNTER — TELEPHONE (OUTPATIENT)
Dept: SURGERY | Facility: CLINIC | Age: 43
End: 2023-11-07
Payer: COMMERCIAL

## 2023-11-08 ENCOUNTER — TELEPHONE (OUTPATIENT)
Dept: SURGERY | Facility: CLINIC | Age: 43
End: 2023-11-08
Payer: COMMERCIAL

## 2023-11-08 NOTE — TELEPHONE ENCOUNTER
Called pt today review the soft food diet. She started today  She tried eggs today. Tolerating it well.   She is meeting her protein goal  She is struggling with fluid  because water or other fluids dont feel good.   Suggested adding lemon to her water.   Reminded to eat slowly, take small bites and chew well.   Reminded to follow directions and read NG booklet   [place]

## 2023-11-17 LAB
ATRIAL RATE: 69 BPM
P AXIS: 8 DEGREES
P OFFSET: 172 MS
P ONSET: 122 MS
PR INTERVAL: 180 MS
Q ONSET: 212 MS
QRS COUNT: 12 BEATS
QRS DURATION: 90 MS
QT INTERVAL: 436 MS
QTC CALCULATION(BAZETT): 467 MS
QTC FREDERICIA: 457 MS
R AXIS: -2 DEGREES
T AXIS: 12 DEGREES
T OFFSET: 430 MS
VENTRICULAR RATE: 69 BPM

## 2023-11-17 NOTE — PROGRESS NOTES
Follow Up Bariatric Nutrition Assessment    Name: Meli Tsang  MRN: [id number]  Date: 11/20/23     Surgery Date:  10.11.23  Surgeon:  Isela  Procedure:  gastric bypass    ASSESSMENT:    Current weight:     Vitals:    11/20/23 1051   Weight: 135 kg (298 lb)                  Ht:         BMI:  Body mass index is 45.31 kg/m².    Previous weight:   331lbs  Initial start weight:   332lbs  EBW:  168lbs  Total weight change:  34lbs  %EBW Lost: 20.2%    PROGRESS:    Nutrition Interventions for last encounter   Continue to drink your protein shakes to meet your goal of 60-70 g of protein per day. Begin measuring how much protein you can eat on the soft diet so that you know when to start weaning off of your protein shakes.   Continue to drink 64 oz. of zero calorie beverages per day  Continue no drinking 30 min before, during the meal and for 30 minutes after the meal  Continue to exercise  Advance to the puree diet for 1 week, then soft food for 3 weeks  Remember to eat slowly and chew thoroughly  Try one new food at a time to test for any intolerances.   Continue to take all of your vitamins and minerals.     CHANGES IN TREATMENT:   Patient met goals: Yes         24 hour food recall:   Breakfast:  protein shake or eggs w/grits   Snack: greek yogurt or cheese stick or cottage cheese  Lunch: Lean Cuisine or turkey chili or meatloaf w/ turkey SF BBQ sauce potatoes and veggies  Snack:  banana  Dinner:  turkey chili   Snack:   Beverages:  water or herbal tea   Alcohol: no      Vitamins:  2 Flintstones,  4 [place]elt Calcium, and B12    Medications:   Current Outpatient Medications:     alcohol swabs pads, medicated, WIPE AREA FOR 15 SECONDS AND ALLOW TO DRY PRIOR TO PRIOR TO INJECTION ENOXAPARIN, Disp: , Rfl:     docosahexaenoic acid/epa (FISH OIL ORAL), Take 1 capsule by mouth once daily., Disp: , Rfl:     enoxaparin (Lovenox) 60 mg/0.6 mL syringe, Inject 0.6 mL (60 mg) under the skin every 28 (twenty-eight) days.  POST OP. ROTATE  INJECTION SITES, Disp: , Rfl:     gabapentin (Neurontin) 300 mg capsule, Take 1 capsule (300 mg) by mouth 3 times a day for 14 days., Disp: 42 capsule, Rfl: 0    hydroCHLOROthiazide (HYDRODiuril) 25 mg tablet, Take 1 tablet (25 mg) by mouth once daily., Disp: , Rfl:     levonorgestrel (Mirena) 21 mcg/24 hours (8 yrs) 52 mg IUD, 52 mg by intrauterine route 1 time., Disp: , Rfl:     levothyroxine (Synthroid, Levoxyl) 50 mcg tablet, Take 1 tablet (50 mcg) by mouth once daily. As directed, Disp: , Rfl:     mecobalamin (B12 ACTIVE ORAL), Take 1 tablet by mouth once daily. As directeddd, Disp: , Rfl:     multivitamin (MULTIPLE VITAMINS ORAL), Take 1 tablet by mouth once daily., Disp: , Rfl:     NON FORMULARY, Ocycodone hcl solution 5mg/5ml take 5ml every 6 hrs as needed for severe pain alternat with tylenol, Disp: , Rfl:     omeprazole (PriLOSEC) 40 mg DR capsule, OPEN CAPSULE AND SPRINKLE ON SUGAR FREE PUDDING OR APPLESAUCE AND TAKE BY MOUTH ONCE DAILY. DO NOT CRUSH OR CHEW BEADS., Disp: , Rfl:     ondansetron ODT (Zofran-ODT) 4 mg disintegrating tablet, Take 1-2 tablets (4-8 mg) by mouth. EVERY 6 TO 8 HOURS AS NEEDED FOR NAUSEA, Disp: , Rfl:     rosuvastatin (Crestor) 40 mg tablet, Take 1 tablet (40 mg) by mouth once daily., Disp: , Rfl:     sertraline (Zoloft) 50 mg tablet, Take 1 tablet (50 mg) by mouth once daily., Disp: , Rfl:     Sharps Container, 1 each. USE AFTER INJECTIONS, Disp: , Rfl:     Physical Activity: walking 1 mile every day     READINESS TO LEARN:  Motivation to learn:  Interested      Understanding of instruction: Good      Anticipated Compliance:  Good      Family Support: Unable to assess-family not present     Patient presents with post-op weight loss surgery gastric bypass. The pt is 6 weeks postop.   Patient has lost 34 pounds since initial assessment accounting for 20.2% loss excess body weight.  Tolerating soft diet without difficulty.  Noticed that eating slowly helps her recognize her  tolerance to certain foods. Milk products have been giving her gas in larger amounts. Protein intake is adequate for post-op individual. Has increased her fluid intake and now has regular BM. Has been getting around 64oz of fluids. Patient is supplementing recommended vitamin/minerals. The pt has plans to go to the gym.   Discussed the transition diet. Reminded pt to eat slowly, chew thoroughly and to try on new food at a time. Transition diet to start on 11/22.     Malnutrition Screening  Significant unintentional weight loss? n/a  Eating less than 75% of usual intake for more than 2 weeks? n/a    Nutrition Diagnosis:   Increased protein and nutrition needs related to altered GI function as evidenced by pt. s/p gastric bypass.  Food- and nutrition-related knowledge deficit related to lack of prior exposure to surgical weight loss information as evidenced by diet recall.     Nutrition Interventions:   Modify type and amount of food and nutrients within meals and snacks.  Comprehensive Nutrition Education  -Nutrition education materials: none      Recommendations:    Eat 60-70 g of protein per day. Prioritize protein at every meal.   Drink 64 oz. of zero calorie beverages per day. Your herbal tea counts towards your fluid.   Continue no drinking 30 min before, during the meal and for 30 minutes after the meal  Increase intensity and duration of exercise  Advance to transition diet on 11/22. Try raw veggies, fresh fruit and lean ground beef.   Eat slowly, chew thoroughly  Try one new food at a time.   Continue current vit/min regimen. Try [employer] berries and cream calcium.     Nutrition Monitoring and Evaluation:   1-2 pounds weight loss per week  Criteria: weight check, food recall  Need for Follow-up: 3 months    Mariella Patten MS, RD, LD  Phone: [phone]

## 2023-11-20 ENCOUNTER — TELEMEDICINE (OUTPATIENT)
Dept: SURGERY | Facility: CLINIC | Age: 43
End: 2023-11-20
Payer: COMMERCIAL

## 2023-11-20 ENCOUNTER — TELEMEDICINE CLINICAL SUPPORT (OUTPATIENT)
Dept: SURGERY | Facility: CLINIC | Age: 43
End: 2023-11-20
Payer: COMMERCIAL

## 2023-11-20 VITALS — WEIGHT: 293 LBS | BODY MASS INDEX: 45.31 KG/M2

## 2023-11-20 DIAGNOSIS — Z98.84 S/P GASTRIC BYPASS: Primary | ICD-10-CM

## 2023-11-20 PROCEDURE — 99024 POSTOP FOLLOW-UP VISIT: CPT | Performed by: NURSE PRACTITIONER

## 2023-11-20 NOTE — PROGRESS NOTES
BARIATRIC SURGERY CLINIC  FOLLOW UP NOTE      Name: Meli Tsang  MRN: [id number]    Index Surgery  Date of Surgery: 10/11/23   Surgeon: Isela    Surgical Procedure: Laparoscopic zulay en y gastric bypass 27994    HPI:   Presenting for follow up visit. 6 week POV    Diet Soft foods, has started to introduce some regular protein sources and doing well.    She is still having poor tolerance of pork.      Exercise Walk 1 mile daily, weekends 2-3 miles    Concerns related to:  Nausea/Vomiting, Reflux: Denies unless didn't 'chew enough or too big a bite.    Abdominal Pain: Denies  Diarrhea/Constipation + constipation, improving with increased fluids.  Uses decaf tea which helps her meet fluid goals.      DAILY SUPPLEMENTS:  Calcium: Calcium Citrate w/ vitamin D (1200 - 1500mg) BariMelts  Multivitamin & Minerals: 2 per day  Vitamin B12: 500 mcg/day   Vitamin D3: 3000 units  Iron/Other: n/a  PPI: 40mg daily      Current Outpatient Medications   Medication Sig Dispense Refill    alcohol swabs pads, medicated WIPE AREA FOR 15 SECONDS AND ALLOW TO DRY PRIOR TO PRIOR TO INJECTION ENOXAPARIN      docosahexaenoic acid/epa (FISH OIL ORAL) Take 1 capsule by mouth once daily.      gabapentin (Neurontin) 300 mg capsule Take 1 capsule (300 mg) by mouth 3 times a day for 14 days. 42 capsule 0    hydroCHLOROthiazide (HYDRODiuril) 25 mg tablet Take 1 tablet (25 mg) by mouth once daily.      levonorgestrel (Mirena) 21 mcg/24 hours (8 yrs) 52 mg IUD 52 mg by intrauterine route 1 time.      levothyroxine (Synthroid, Levoxyl) 50 mcg tablet Take 1 tablet (50 mcg) by mouth once daily. As directed      mecobalamin (B12 ACTIVE ORAL) Take 1 tablet by mouth once daily. As directeddd      multivitamin (MULTIPLE VITAMINS ORAL) Take 1 tablet by mouth once daily.      NON FORMULARY Ocycodone hcl solution 5mg/5ml take 5ml every 6 hrs as needed for severe pain alternat with tylenol      omeprazole (PriLOSEC) 40 mg DR capsule OPEN CAPSULE AND SPRINKLE ON  SUGAR FREE PUDDING OR APPLESAUCE AND TAKE BY MOUTH ONCE DAILY. DO NOT CRUSH OR CHEW BEADS.      ondansetron ODT (Zofran-ODT) 4 mg disintegrating tablet Take 1-2 tablets (4-8 mg) by mouth. EVERY 6 TO 8 HOURS AS NEEDED FOR NAUSEA      rosuvastatin (Crestor) 40 mg tablet Take 1 tablet (40 mg) by mouth once daily.      sertraline (Zoloft) 50 mg tablet Take 1 tablet (50 mg) by mouth once daily.       No current facility-administered medications for this visit.       Comorbidities:  Patient Active Problem List   Diagnosis    Urge and stress incontinence    Recurrent UTI    Pelvic pain in female    Left ureteral calculus    Hypercholesterolemia    Hematuria    Essential hypertension    Elevated liver function tests    Daytime somnolence    Coital headache    S/P gastric bypass    Chronic GERD    Anxiety and depression    Coronary artery calcification    Morbid obesity with BMI of 50.0-59.9, adult (CMS/[facility])    YONAS (obstructive sleep apnea)    Post-operative nausea and vomiting    Obesity    Hypothyroidism         REVIEW OF SYSTEMS:  CONSTITUTIONAL: Patient denies fevers, chills, sweats and weight changes.  CARDIOVASCULAR: Patient denies chest pains, palpitations, orthopnea and paroxysmal nocturnal dyspnea.  RESPIRATORY: No dyspnea on exertion, no wheezing or cough.  GI: No nausea, vomiting, diarrhea, constipation, abdominal pain, hematochezia or melena.  MUSCULOSKELETAL: No myalgias or arthralgias.  NEUROLOGIC: No chronic headaches, no seizures. Patient denies numbness, tingling or weakness.  PSYCHIATRIC: Patient denies problems with mood disturbance. No problems with anxiety.  ENDOCRINE: No excessive urination or excessive thirst.  DERMATOLOGIC: Patient denies any rashes or skin changes.    PHYSICAL EXAM:  Wt 135 kg (298 lb)   BMI 45.31 kg/m²   Alert, well appearing, no acute distress, nourished, hydrated.  Anicteric sclera, no ptosis  Facial symmetry  Neck supple  Unlabored respirations.  Easily conversant without  increased respiratory effort  Oriented to person, place, time.  Judgement intact.  Appropriate mood, affect.       ASSESSMENT & PLAN:  43 y.o. female presenting for follow up visit s/p bariatric surgery.    Weight Loss: Initial  332 ->   Current:     Wt Readings from Last 1 Encounters:   11/20/23 135 kg (298 lb)       Problem List Items Addressed This Visit       S/P gastric bypass - Primary     GBP 10/11/23  Doing well, No acute post bariatric surgery complications  No acute issues or concerns presented today.  Tolerating diet with appropriate protein and fluid intake, beginning to transition to regular diet.  Taking appropriate supplements  Bowel regularity mild constipation, managing with increased fluids  Exercise walking daily  Completed lovenox injections    Plan:  -Continue to follow protein forward, reduced calorie, whole foods based diet, follow diet direction of bariatric RD  -Continue to participate in regular exercise with goal to achieve 200-300 minutes per week of aerobic & resistance training for preservation of lean body mass.  -Continue multivitamin and supplements to support micronutrient needs  -Join Support Groups  -Ongoing need for anti reflux medications discussed and continued until 6m FUV.  -Bowel hygiene reviewed, encouraged adequate fluids, increased dietary fiber and reviewed as needed use of over the counter treatments to promote bowel support.   -Labs - see orders in prep for 3m POV           Relevant Orders    Iron and TIBC    Ferritin    Vitamin D 25-Hydroxy,Total (for eval of Vitamin D levels)    Parathyroid Hormone, Intact    Comprehensive Metabolic Panel    Folate    Vitamin B1, Whole Blood    Vitamin B12    CBC       I personally spent >50% of total minutes face to face with the patient in counseling and discussion and/or coordination of care as described above.

## 2023-11-20 NOTE — ASSESSMENT & PLAN NOTE
GBP 10/11/23  Doing well, No acute post bariatric surgery complications  No acute issues or concerns presented today.  Tolerating diet with appropriate protein and fluid intake, beginning to transition to regular diet.  Taking appropriate supplements  Bowel regularity mild constipation, managing with increased fluids  Exercise walking daily  Completed lovenox injections    Plan:  -Continue to follow protein forward, reduced calorie, whole foods based diet, follow diet direction of bariatric RD  -Continue to participate in regular exercise with goal to achieve 200-300 minutes per week of aerobic & resistance training for preservation of lean body mass.  -Continue multivitamin and supplements to support micronutrient needs  -Join Support Groups  -Ongoing need for anti reflux medications discussed and continued until 6m FUV.  -Bowel hygiene reviewed, encouraged adequate fluids, increased dietary fiber and reviewed as needed use of over the counter treatments to promote bowel support.   -Labs - see orders in prep for 3m POV

## 2023-12-27 ENCOUNTER — LAB (OUTPATIENT)
Dept: LAB | Facility: LAB | Age: 43
End: 2023-12-27
Payer: COMMERCIAL

## 2023-12-27 DIAGNOSIS — Z98.84 S/P GASTRIC BYPASS: ICD-10-CM

## 2023-12-27 LAB
25(OH)D3 SERPL-MCNC: 38 NG/ML (ref 30–100)
ALBUMIN SERPL BCP-MCNC: 4.2 G/DL (ref 3.4–5)
ALP SERPL-CCNC: 51 U/L (ref 33–110)
ALT SERPL W P-5'-P-CCNC: 21 U/L (ref 7–45)
ANION GAP SERPL CALC-SCNC: 11 MMOL/L (ref 10–20)
AST SERPL W P-5'-P-CCNC: 15 U/L (ref 9–39)
BILIRUB SERPL-MCNC: 1 MG/DL (ref 0–1.2)
BUN SERPL-MCNC: 11 MG/DL (ref 6–23)
CALCIUM SERPL-MCNC: 9.6 MG/DL (ref 8.6–10.3)
CHLORIDE SERPL-SCNC: 102 MMOL/L (ref 98–107)
CO2 SERPL-SCNC: 34 MMOL/L (ref 21–32)
CREAT SERPL-MCNC: 0.62 MG/DL (ref 0.5–1.05)
ERYTHROCYTE [DISTWIDTH] IN BLOOD BY AUTOMATED COUNT: 14.6 % (ref 11.5–14.5)
FERRITIN SERPL-MCNC: 227 NG/ML (ref 8–150)
FOLATE SERPL-MCNC: >24 NG/ML
GFR SERPL CREATININE-BSD FRML MDRD: >90 ML/MIN/1.73M*2
GLUCOSE SERPL-MCNC: 91 MG/DL (ref 74–99)
HCT VFR BLD AUTO: 41.4 % (ref 36–46)
HGB BLD-MCNC: 13.3 G/DL (ref 12–16)
IRON SATN MFR SERPL: 21 % (ref 25–45)
IRON SERPL-MCNC: 71 UG/DL (ref 35–150)
MCH RBC QN AUTO: 28.1 PG (ref 26–34)
MCHC RBC AUTO-ENTMCNC: 32.1 G/DL (ref 32–36)
MCV RBC AUTO: 88 FL (ref 80–100)
NRBC BLD-RTO: 0 /100 WBCS (ref 0–0)
PLATELET # BLD AUTO: 309 X10*3/UL (ref 150–450)
POTASSIUM SERPL-SCNC: 3.7 MMOL/L (ref 3.5–5.3)
PROT SERPL-MCNC: 6.5 G/DL (ref 6.4–8.2)
PTH-INTACT SERPL-MCNC: 66.9 PG/ML (ref 18.5–88)
RBC # BLD AUTO: 4.73 X10*6/UL (ref 4–5.2)
SODIUM SERPL-SCNC: 143 MMOL/L (ref 136–145)
TIBC SERPL-MCNC: 346 UG/DL (ref 240–445)
UIBC SERPL-MCNC: 275 UG/DL (ref 110–370)
VIT B12 SERPL-MCNC: 832 PG/ML (ref 211–911)
WBC # BLD AUTO: 7.5 X10*3/UL (ref 4.4–11.3)

## 2023-12-27 PROCEDURE — 83970 ASSAY OF PARATHORMONE: CPT

## 2023-12-27 PROCEDURE — 85027 COMPLETE CBC AUTOMATED: CPT

## 2023-12-27 PROCEDURE — 80053 COMPREHEN METABOLIC PANEL: CPT

## 2023-12-27 PROCEDURE — 84425 ASSAY OF VITAMIN B-1: CPT

## 2023-12-27 PROCEDURE — 36415 COLL VENOUS BLD VENIPUNCTURE: CPT

## 2023-12-27 PROCEDURE — 83540 ASSAY OF IRON: CPT

## 2023-12-27 PROCEDURE — 82728 ASSAY OF FERRITIN: CPT

## 2023-12-27 PROCEDURE — 82306 VITAMIN D 25 HYDROXY: CPT

## 2023-12-27 PROCEDURE — 82607 VITAMIN B-12: CPT

## 2023-12-27 PROCEDURE — 83550 IRON BINDING TEST: CPT

## 2023-12-27 PROCEDURE — 82746 ASSAY OF FOLIC ACID SERUM: CPT

## 2023-12-30 LAB — VIT B1 PYROPHOSHATE BLD-SCNC: 128 NMOL/L (ref 70–180)

## 2024-01-05 NOTE — PROGRESS NOTES
BARIATRIC SURGERY CLINIC  FOLLOW UP NOTE      Name: Meli Tsang  MRN: [id number]    Index Surgery  Date of Surgery: 10/11/2023   Surgeon: Isela    Surgical Procedure: Laparoscopic zulay en y gastric bypass 00242    HPI:   Presenting for follow up visit. 3 mo POV    Diet Regular Diet - Doing well with variety of foods. Able to meet protein goals with use of protein shake, having some difficulty adding in red meat/pork, does ok with turkey/chicken if chews very thoroughly.      Exercise Joined gym - doing aerobics class 3x/week and planning to do self directed exercise on her own other days per week at least 45 min/session.  Has found that exercise is much easier, doesn't need her knee brace!    Concerns related to:  Nausea/Vomiting, Reflux: denies  Abdominal Pain: denies  Diarrhea/Constipation denies    DAILY SUPPLEMENTS:  Calcium: Calcium Citrate w/ vitamin D (1200 - 1500mg)  Multivitamin & Minerals: 2 per day  Vitamin B12: 500 mcg/day   Vitamin D3: 3000 units  Iron/Other: discussed addition of once daily iron plus MVI today  PPI: 40mg       Current Outpatient Medications   Medication Sig Dispense Refill    docosahexaenoic acid/epa (FISH OIL ORAL) Take 1 capsule by mouth once daily.      gabapentin (Neurontin) 300 mg capsule Take 1 capsule (300 mg) by mouth 3 times a day for 14 days. 42 capsule 0    hydroCHLOROthiazide (HYDRODiuril) 25 mg tablet Take 1 tablet (25 mg) by mouth once daily.      levonorgestrel (Mirena) 21 mcg/24 hours (8 yrs) 52 mg IUD 52 mg by intrauterine route 1 time.      levothyroxine (Synthroid, Levoxyl) 50 mcg tablet Take 1 tablet (50 mcg) by mouth once daily. As directed      mecobalamin (B12 ACTIVE ORAL) Take 1 tablet by mouth once daily. As directeddd      multivitamin (MULTIPLE VITAMINS ORAL) Take 1 tablet by mouth once daily.      NON FORMULARY Ocycodone hcl solution 5mg/5ml take 5ml every 6 hrs as needed for severe pain alternat with tylenol      omeprazole (PriLOSEC) 40 mg DR capsule OPEN  CAPSULE AND SPRINKLE ON SUGAR FREE PUDDING OR APPLESAUCE AND TAKE BY MOUTH ONCE DAILY. DO NOT CRUSH OR CHEW BEADS.      ondansetron ODT (Zofran-ODT) 4 mg disintegrating tablet Take 1-2 tablets (4-8 mg) by mouth. EVERY 6 TO 8 HOURS AS NEEDED FOR NAUSEA      rosuvastatin (Crestor) 40 mg tablet Take 1 tablet (40 mg) by mouth once daily.      sertraline (Zoloft) 50 mg tablet Take 1 tablet (50 mg) by mouth once daily.       No current facility-administered medications for this visit.       Comorbidities:  Patient Active Problem List   Diagnosis    Urge and stress incontinence    Recurrent UTI    Pelvic pain in female    Left ureteral calculus    Hypercholesterolemia    Hematuria    Essential hypertension    Elevated liver function tests    Daytime somnolence    Coital headache    S/P gastric bypass    Chronic GERD    Anxiety and depression    Coronary artery calcification    Morbid obesity with BMI of 50.0-59.9, adult (CMS/[facility])    YONAS (obstructive sleep apnea)    Post-operative nausea and vomiting    Obesity    Hypothyroidism         REVIEW OF SYSTEMS:  CONSTITUTIONAL: Patient denies fevers, chills, sweats and weight changes.  CARDIOVASCULAR: Patient denies chest pains, palpitations, orthopnea and paroxysmal nocturnal dyspnea.  RESPIRATORY: No dyspnea on exertion, no wheezing or cough.  GI: No nausea, vomiting, diarrhea, constipation, abdominal pain, hematochezia or melena.  MUSCULOSKELETAL: No myalgias or arthralgias.  NEUROLOGIC: No chronic headaches, no seizures. Patient denies numbness, tingling or weakness.  PSYCHIATRIC: Patient denies problems with mood disturbance. No problems with anxiety.  ENDOCRINE: No excessive urination or excessive thirst.  DERMATOLOGIC: Patient denies any rashes or skin changes.    PHYSICAL EXAM:  Wt 127 kg (281 lb)   BMI 42.73 kg/m²   Alert, well appearing, no acute distress, nourished, hydrated.  Anicteric sclera, no ptosis  Facial symmetry  Neck supple  Unlabored respirations.  Easily  conversant without increased respiratory effort  Oriented to person, place, time.  Judgement intact.  Appropriate mood, affect.       ASSESSMENT & PLAN:  43 y.o. female presenting for follow up visit s/p bariatric surgery.    Current Weight:     Wt Readings from Last 1 Encounters:   01/08/24 127 kg (281 lb)       Problem List Items Addressed This Visit       S/P gastric bypass - Primary     S/P GBP 10/11/23    15.4% total body weight loss    Doing well, No acute post bariatric surgery complications  No acute issues or concerns presented today.  Tolerating diet with appropriate protein and fluid intake, using shake to meet protein goals  Taking appropriate supplements  Bowel regularity WNL  Exercise walking daily  Completed 3 mo labs- mild low iron    Plan:  -Continue to follow protein forward, reduced calorie, whole foods based diet, follow diet direction of bariatric RD  -Continue to participate in regular exercise with goal to achieve 200-300 minutes per week of aerobic & resistance training for preservation of lean body mass.  -Continue multivitamin and supplements to support micronutrient needs  -Join Support Groups  -Ongoing need for anti reflux medications discussed and continued until 6m FUV.  -Bowel hygiene reviewed, encouraged adequate fluids, increased dietary fiber and reviewed as needed use of over the counter treatments to promote bowel support.   -Labs - low iron otherwise WNL - add OTC oral iron once daily plus MVI              I personally spent >50% of total minutes face to face with the patient in counseling and discussion and/or coordination of care as described above.

## 2024-01-08 ENCOUNTER — TELEMEDICINE (OUTPATIENT)
Dept: SURGERY | Facility: CLINIC | Age: 44
End: 2024-01-08
Payer: COMMERCIAL

## 2024-01-08 VITALS — WEIGHT: 281 LBS | BODY MASS INDEX: 42.73 KG/M2

## 2024-01-08 DIAGNOSIS — Z98.84 S/P GASTRIC BYPASS: Primary | ICD-10-CM

## 2024-01-08 PROCEDURE — 99024 POSTOP FOLLOW-UP VISIT: CPT | Performed by: NURSE PRACTITIONER

## 2024-01-08 NOTE — PATIENT INSTRUCTIONS
"The following are the recommendations we discussed at your appointment today:  1. Nutrition  - Please make sure you are seeing the bariatric dietitian regularly.    Dietitian Information:   Nate Wallace: [phone]  [facility] [place] [phone]    Your Protein Goals will gradually increase as you get further out from surgery:  3-6 months - 60-75 GRAMS PER DAY    - Follow Pouch Rules;.   Stop drinking 30 minutes before your meals, Take 30 minutes to eat your meal   - NO DRINKING DURING MEALS, Wait for 30 minutes after your meal to drink  - Eat 5 servings of fruits and veggies daily.  A serving is 1 small (tennis ball size) piece of whole fruit, 1/2 cup fresh fruit, 1 cup non starchy vegetables  - Eat 3 small meals and 1-2 healthy, protein rich, snacks per day.    EAT PROTEIN FIRST AT MEALS, 2nd non starchy vegetable or fruit serving, consume starches last and aim for whole grains of small portion.  This pattern of eating ensures you are getting full on high quality protein and higher fiber foods with many vitamins and minerals to support adequate nutrition first.      2. Exercise Recommendations:    Regular physical activity is CRITICAL for long term successful weight management.    Strive for a minimum weekly exercise goal of at least 200 minutes per week of aerobic exercise (45 minutes at least 5 days per week or 30 minutes daily)    Strength based resistance training is critical for helping to maintain and build lean body mass/muscle mass which is very important for long term health.  Having higher muscle mass is associated with better health outcomes and can help to maintain higher calorie expenditure.      Designing a Strength Program:  Select 3-4 exercises that target different major muscle groups.  - Emphasize the following movements \"pull, push, squat, hip hinge\"  \"Pull\" examples - pull up, bent over row or dumbbell row, pull down with weight bar or resistance band  \"Push\" examples - push " "up, bench press, chest flys, dips, overhead press, dumbbell bench press  \"Squat\" examples - air squat, chair squat, lunge steps, goblet squat, front squat, etc  \"Hip hinge\" examples - kettle bell swing, good morning, glute bridge, deadlift, suitcase pick ups, hip thrust    Perform two to three sets of eight to 15 repetitions of each exercise.  Try to use a resistance that feels like an \"8 out of 10\" effort, with 10 being the highest effort you can give.    To get stronger, try increasing either the weight, number of repetitions, number of sets or number of exercises every 4-8 weeks as you feel more comfortable with your current program.      3. Fluids  - Drink at least 60 oz of water every day.   - Wait 30 minutes before or after meals to drink fluids.  - Avoid carbonated beverages.    4. Vitamins  - Remember to take your multivitamins 2 times daily, once in the morning and once in the evening  - Take your calcium 2-3 times daily, at least 2 hours apart from the multivitamin - total daily dose at least 1200-1500mg per day.    - Vitamin D3 3000 units per day  - B12 - depending on your blood work and how well you absorb B12 from your multivitamin you may need additional B12 of 350-500mcg oral per day.    5. Labs  - Add over the counter iron supplement in addition to your multivitamin, calcium & b12.  Do recommend Vitamin D at least 2000 units per day in addition to your calcium.     Follow-up with Dietitian for bariatric diet optimization  Follow-up with PCP for general health questions and ongoing management of routine health concerns      "

## 2024-01-08 NOTE — ASSESSMENT & PLAN NOTE
S/P GBP 10/11/23    15.4% total body weight loss    Doing well, No acute post bariatric surgery complications  No acute issues or concerns presented today.  Tolerating diet with appropriate protein and fluid intake, using shake to meet protein goals  Taking appropriate supplements  Bowel regularity WNL  Exercise walking daily  Completed 3 mo labs- mild low iron    Plan:  -Continue to follow protein forward, reduced calorie, whole foods based diet, follow diet direction of bariatric RD  -Continue to participate in regular exercise with goal to achieve 200-300 minutes per week of aerobic & resistance training for preservation of lean body mass.  -Continue multivitamin and supplements to support micronutrient needs  -Join Support Groups  -Ongoing need for anti reflux medications discussed and continued until 6m FUV.  -Bowel hygiene reviewed, encouraged adequate fluids, increased dietary fiber and reviewed as needed use of over the counter treatments to promote bowel support.   -Labs - low iron otherwise WNL - add OTC oral iron once daily plus MVI

## 2024-04-08 ENCOUNTER — APPOINTMENT (OUTPATIENT)
Dept: SURGERY | Facility: CLINIC | Age: 44
End: 2024-04-08

## 2024-05-15 ENCOUNTER — TELEPHONE (OUTPATIENT)
Dept: SURGERY | Facility: CLINIC | Age: 44
End: 2024-05-15
Payer: COMMERCIAL

## 2024-05-21 ENCOUNTER — TELEPHONE (OUTPATIENT)
Dept: SURGERY | Facility: CLINIC | Age: 44
End: 2024-05-21
Payer: COMMERCIAL

## 2024-05-21 NOTE — TELEPHONE ENCOUNTER
Unable to reach patient, left voicemail message for patient to return the call.     Patient needs 3 mo for 6m GBP FUV + Jacky PAGE with BARRIE Marion and KAYLEE Wallace.

## 2024-06-22 ENCOUNTER — LAB (OUTPATIENT)
Dept: LAB | Facility: LAB | Age: 44
End: 2024-06-22
Payer: COMMERCIAL

## 2024-06-22 DIAGNOSIS — Z01.818 PREOP TESTING: ICD-10-CM

## 2024-06-22 LAB
ERYTHROCYTE [DISTWIDTH] IN BLOOD BY AUTOMATED COUNT: 14.5 % (ref 11.5–14.5)
HCT VFR BLD AUTO: 40.5 % (ref 36–46)
HGB BLD-MCNC: 12.9 G/DL (ref 12–16)
MCH RBC QN AUTO: 28.9 PG (ref 26–34)
MCHC RBC AUTO-ENTMCNC: 31.9 G/DL (ref 32–36)
MCV RBC AUTO: 91 FL (ref 80–100)
NRBC BLD-RTO: 0 /100 WBCS (ref 0–0)
PLATELET # BLD AUTO: 315 X10*3/UL (ref 150–450)
RBC # BLD AUTO: 4.47 X10*6/UL (ref 4–5.2)
WBC # BLD AUTO: 7.1 X10*3/UL (ref 4.4–11.3)

## 2024-06-22 PROCEDURE — 36415 COLL VENOUS BLD VENIPUNCTURE: CPT

## 2024-06-22 PROCEDURE — 85027 COMPLETE CBC AUTOMATED: CPT

## 2024-06-25 ENCOUNTER — TELEMEDICINE CLINICAL SUPPORT (OUTPATIENT)
Dept: SURGERY | Facility: CLINIC | Age: 44
End: 2024-06-25
Payer: COMMERCIAL

## 2024-06-25 ENCOUNTER — TELEMEDICINE (OUTPATIENT)
Dept: SURGERY | Facility: CLINIC | Age: 44
End: 2024-06-25
Payer: COMMERCIAL

## 2024-06-25 VITALS — WEIGHT: 238 LBS | BODY MASS INDEX: 36.07 KG/M2 | HEIGHT: 68 IN

## 2024-06-25 DIAGNOSIS — Z98.84 S/P GASTRIC BYPASS: ICD-10-CM

## 2024-06-25 DIAGNOSIS — K91.2 INTESTINAL MALABSORPTION FOLLOWING GASTRECTOMY (HHS-HCC): ICD-10-CM

## 2024-06-25 DIAGNOSIS — E66.01 CLASS 2 SEVERE OBESITY DUE TO EXCESS CALORIES WITH SERIOUS COMORBIDITY AND BODY MASS INDEX (BMI) OF 36.0 TO 36.9 IN ADULT (MULTI): Primary | ICD-10-CM

## 2024-06-25 DIAGNOSIS — Z71.3 DIETARY COUNSELING: ICD-10-CM

## 2024-06-25 DIAGNOSIS — Z90.3 INTESTINAL MALABSORPTION FOLLOWING GASTRECTOMY (HHS-HCC): ICD-10-CM

## 2024-06-25 DIAGNOSIS — E66.01 MORBID OBESITY WITH BMI OF 50.0-59.9, ADULT (MULTI): ICD-10-CM

## 2024-06-25 PROCEDURE — 3008F BODY MASS INDEX DOCD: CPT

## 2024-06-25 PROCEDURE — 99214 OFFICE O/P EST MOD 30 MIN: CPT

## 2024-06-25 PROCEDURE — 99214 OFFICE O/P EST MOD 30 MIN: CPT | Mod: 95

## 2024-06-25 NOTE — PATIENT INSTRUCTIONS
Congratulations on your weight loss!    The following are the recommendations we discussed at your appointment today:    1. Nutrition  - Please see the dietician BEFORE our next visit.   (You will need to have this visit before we can add weight loss medications).  * Dietitian Information:   * Vasyl/Nate: Madison: [phone]  * Juana: Deidre: [phone]  * Vishal: Erin: [phone]  - Aim for 60 grams of protein and 60 oz of water daily.  - Follow Pouch Rules; handout given today.   - Eat 5 servings of fruits and veggies daily.  - Eat 3 small meals and 1-2 healthy, protein rich, snacks per day.  - Aim for 1209-5675 calories per day  - Aim for <50 grams of carbs per day  - Keep a food diary and log every single thing you eat, every day.    2. Exercise Recommendations:   - Aim for 30 minutes per day, 5 days per week to start, with progression over several weeks to more vigorous intensity.   - Emphasize increasing duration, rather than intensity initially.   - Moderate-intensity physical activity includes:  * Brisk Walking  * Biking (slower than 10 MPH)  * Water Aerobics  * Vigorous housework (washing windows, vacuuming, mopping)  * Mowing the lawn (push mower)  * Gardening  * Ballroom dancing    3. Fluids  - Drink at least 60 oz of water every day.   - Wait 30 minutes before or after meals to drink fluids.  - Avoid carbonated beverages.    4. Vitamins  - Remember to take your multivitamins 2 times daily, once in the morning and once in the evening  - Take your calcium 2-3 times daily, at least 2 hours apart from the multivitamin    5. Come to support groups!   - View the schedule to find a time and location.     6. Labs  - We will check labs today and call with abnormal values.   - We will send prescriptions for abnormal values.   - A copy of the results can be viewed on [facility] portal.   - If you are not on the [facility] health portal, a copy will be mailed to your home.    7. Anti-Obesity Medications  - Implement  the above dietary and exercise recommendations  - Return to office in 1-2 months; showing these changes have been made and some weight has been lost.  - Then, we can discuss adding medications as adjunct therapy.     Follow-up in 1-2 months.   To schedule an appointment call: [phone]

## 2024-06-25 NOTE — PROGRESS NOTES
Surgery Date:  10.11.23  Surgeon:  Isela  Procedure:  gastric bypass    ASSESSMENT:  Current weight pounds:    238.0              Ht:   68.0  in.   BMI:  36.19  Previous weight pounds:   281.0   1/8/24  Initial start weight:   332lbs  EBW:  168lbs  Total weight change pounds :  94.0  %EBW Lost:   56.0%    PROGRESS:  Nutrition Interventions for last encounter (date):   1.          Eat 60-70 g of protein per day  2.          Continue to drink 64 oz. of zero calorie beverages per day  3.          Continue no drinking 30 min before, during the meal and for 30 minutes after the meal  4.          Continue to  exercise  5.          Add 1 more Barimelt calcium tab per day to take you to your goal of 1250 mg daily.   6.         Attend monthly support groups    CHANGES IN TREATMENT:   Patient met goals:    Partially   24 hour food recall:   Breakfast:  1/2 c dry cereal   Snack:  protein shake  30 g   Lunch:  1/2 factor meal 20 g pro  Snack:   handful nuts and craisins   Dinner:    1/2 factor meal 20 g pro  Snack:   none  Beverages:    64 oz water or flavored water,  1 protein shake every day  Alcohol:  gh    Vitamins:     2 Flintstones,  3 Barimelt Calcium,  Iron , Vit D and B12     Physical Activity:   started couch to 5K, walk/jogged a 5K.  Now doing some jogging  and going to the gym, walking daily and does things on lunch break with weights and doing some situps.     READINESS TO LEARN:  Motivation to learn:           Interested      Understanding of instruction: Good        Anticipated Compliance: Good      Family Support: Unable to assess-family not present     Patient presents with post-op weight loss surgery gastric bypass.  Pt is 8 months postop.   Patient has lost 94.0  pounds since initial assessment accounting for 56.0 % loss excess body weight.  Tolerating a regular diet without difficulty.  Protein intake is adequate for post-op individual. Fluid consumption is  adequate. Patient is supplementing recommended  vitamin/minerals but not taking enough calcium. Pt would like switch to supplements she can swallow.  Reviewed what to take.  Pt states concerns/difficulties with meat causing uncontrolled sneezing.  Advised to to eat what she tolerates.  Pt is doing very well. She is doing things that she has never done before like jogging.  She has a list of activities that she has always wanted to do.      Malnutrition Screening  Significant unintentional weight loss? n/a  Eating less than 75% of usual intake for more than 2 weeks? n/a     Nutrition Diagnosis:   1. Increased protein and nutrition needs related to altered GI function as evidenced by pt. s/p gastric bypass.  2. Food- and nutrition-related knowledge deficit related to lack of prior exposure to surgical weight loss information as evidenced by diet recall.     Nutrition Interventions:   1. Modify type and amount of food and nutrients within meals and snacks.  2. Comprehensive Nutrition Education  -Nutrition education materials: SG schedule        Recommendations:    1. Eat at least 80 g of protein per day  2. Continue to drink 64 oz. of zero calorie beverages per day  3. Continue no drinking 30 min before, during the meal and for 30 minutes after the meal  4. Increase intensity and duration of exercise  5. You can switch to Centrum Adult and take 1 tablet 2x/day with food.  Switch to Citrical or generic calcium citrate with vitamin D.  Take 4273-7685 mg of calcium citrate daily.  Take in in divided doses of no more than 500-600 mg at a time 2 hours apart from each other and from your Centrum.  Try Vitron Fe (iron with vitamin c)  6.         Attend monthly support groups    Nutrition Monitoring and Evaluation:   1-2 pounds weight loss per week  Criteria: weight check, food recall  Need for Follow-up: 12 months postop      Madison SALGADO, [facility]  Bariatric Surgery Dietitian  Phone: [phone]  Fax: [phone]

## 2024-06-25 NOTE — PROGRESS NOTES
[facility]  FOLLOW UP NOTE      Name: Meli Tsang  MRN: [id number]    Index Surgery  Date of Surgery: 10/11/2023   Surgeon: Isela    Surgical Procedure: Laparoscopic zulay en y gastric bypass 46592    Virtual or Telephone Consent    An interactive audio and video telecommunication system which permits real time communications between the patient (at the originating site) and provider (at the distant site) was utilized to provide this telehealth service.   Verbal consent was requested and obtained from Meli Tsang on this date, 06/25/24 for a telehealth visit.      HPI:   Presenting for follow up visit. 18 mo POV     Initial pre-surgery weight: 338 lbs  Current weight: 238 lbs  Total weight loss: 100 lbs    Diet Regular Diet - Regular. Meets protein goals. Still drinks shake every morning. Drinks minimum 64 oz/day. Mostly maintains 30-30-30 rule. She developed sneezing spells in response to eating certain meets (chicken breast, stake, chop).     Exercise Joined gym - jogging and running, doing aerobics class 3x/week and planning to do self directed exercise on her own other days per week at least 45 min/session.     Concerns related to:  Nausea/Vomiting, Reflux: denies  Abdominal Pain: denies  Diarrhea/Constipation denies    DAILY SUPPLEMENTS:  Calcium: Calcium Citrate w/ vitamin D (1200 - 1500mg)  Multivitamin & Minerals: 2 per day  Vitamin B12: 500 mcg/day   Vitamin D3: 3000 units - within Calcium Citrate  Iron/Other: iron, fish oil    PPI: denies     Current Outpatient Medications   Medication Sig Dispense Refill    docosahexaenoic acid/epa (FISH OIL ORAL) Take 1 capsule by mouth once daily.      gabapentin (Neurontin) 300 mg capsule Take 1 capsule (300 mg) by mouth 3 times a day for 14 days. 42 capsule 0    hydroCHLOROthiazide (HYDRODiuril) 25 mg tablet Take 1 tablet (25 mg) by mouth once daily.      levonorgestrel (Mirena) 21 mcg/24 hours (8 yrs) 52 mg IUD 52 mg by intrauterine route 1 time.       levothyroxine (Synthroid, Levoxyl) 50 mcg tablet Take 1 tablet (50 mcg) by mouth once daily. As directed      mecobalamin (B12 ACTIVE ORAL) Take 1 tablet by mouth once daily. As directeddd      multivitamin (MULTIPLE VITAMINS ORAL) Take 1 tablet by mouth once daily.      NON FORMULARY Ocycodone hcl solution 5mg/5ml take 5ml every 6 hrs as needed for severe pain alternat with tylenol      omeprazole (PriLOSEC) 40 mg DR capsule OPEN CAPSULE AND SPRINKLE ON SUGAR FREE PUDDING OR APPLESAUCE AND TAKE BY MOUTH ONCE DAILY. DO NOT CRUSH OR CHEW BEADS.      ondansetron ODT (Zofran-ODT) 4 mg disintegrating tablet Take 1-2 tablets (4-8 mg) by mouth. EVERY 6 TO 8 HOURS AS NEEDED FOR NAUSEA      rosuvastatin (Crestor) 40 mg tablet Take 1 tablet (40 mg) by mouth once daily.      sertraline (Zoloft) 50 mg tablet Take 1 tablet (50 mg) by mouth once daily.       No current facility-administered medications for this visit.       Comorbidities:  Patient Active Problem List   Diagnosis    Urge and stress incontinence    Recurrent UTI    Pelvic pain in female    Left ureteral calculus    Hypercholesterolemia    Hematuria    Essential hypertension    Elevated liver function tests    Daytime somnolence    Coital headache    S/P gastric bypass    Chronic GERD    Anxiety and depression    Coronary artery calcification    Morbid obesity with BMI of 50.0-59.9, adult (Multi)    YONAS (obstructive sleep apnea)    Post-operative nausea and vomiting    Obesity    Hypothyroidism    Intestinal malabsorption following gastrectomy ([facility])    Dietary counseling         REVIEW OF SYSTEMS:  CONSTITUTIONAL: Patient denies fevers, chills, sweats and weight changes.  CARDIOVASCULAR: Patient denies chest pains, palpitations, orthopnea and paroxysmal nocturnal dyspnea.  RESPIRATORY: No dyspnea on exertion, no wheezing or cough.  GI: No nausea, vomiting, diarrhea, constipation, abdominal pain, hematochezia or melena.  MUSCULOSKELETAL: No myalgias or  "arthralgias.  NEUROLOGIC: No chronic headaches, no seizures. Patient denies numbness, tingling or weakness.  PSYCHIATRIC: Patient denies problems with mood disturbance. No problems with anxiety.  ENDOCRINE: No excessive urination or excessive thirst.  DERMATOLOGIC: Patient denies any rashes or skin changes.    PHYSICAL EXAM:  Ht 1.727 m (5' 8\")   Wt 108 kg (238 lb)   BMI 36.19 kg/m²   Alert, well appearing, no acute distress, nourished, hydrated.  Anicteric sclera, no ptosis  Facial symmetry  Neck supple  Unlabored respirations.  Easily conversant without increased respiratory effort  Oriented to person, place, time.  Judgement intact.  Appropriate mood, affect.       ASSESSMENT & PLAN:  44 y.o. female presenting for follow up visit s/p bariatric surgery.    338 -> 238     Current Weight: 238 lbs     Wt Readings from Last 1 Encounters:   06/25/24 108 kg (238 lb)       Problem List Items Addressed This Visit       S/P gastric bypass    Relevant Orders    Zinc, Serum or Plasma    Vitamin B12    Vitamin A    Vitamin K    TSH with reflex to Free T4 if abnormal    Ferritin    Vitamin B6    Iron and TIBC    Lipid Panel    Comprehensive Metabolic Panel    Vitamin D 25-Hydroxy,Total (for eval of Vitamin D levels)    Parathyroid Hormone, Intact    Vitamin B1, Whole Blood    Folate    Copper, Blood    CBC    Morbid obesity with BMI of 50.0-59.9, adult (Multi)    Obesity - Primary    Intestinal malabsorption following gastrectomy ([facility])    Relevant Orders    Zinc, Serum or Plasma    Vitamin B12    Vitamin A    Vitamin K    TSH with reflex to Free T4 if abnormal    Ferritin    Vitamin B6    Iron and TIBC    Lipid Panel    Comprehensive Metabolic Panel    Vitamin D 25-Hydroxy,Total (for eval of Vitamin D levels)    Parathyroid Hormone, Intact    Vitamin B1, Whole Blood    Folate    Copper, Blood    CBC    Dietary counseling       Doing well with diet and exercise.   Total Weight Loss of: 100 lbs  Current BMI: 36  Patient is " "doing great. She has great energy level. Compliant with dietary needs and bariatric supplements.  She has developed sneezing spells in response to eating drier cuts of meet/poultry. Recommended to choose more moiste cuts of meat, cook it \"beef roast\" style where it will not be dry. Chew thoroughly.      Blood work order for an upcoming 2 annual visit is placed per patient's request.       > 50% of time spent counseling on the importance of following recommended dietary modifications including: role of diet, low calorie and carbohydrate restrictions, limiting fast food and avoiding high sugary beverages.   Also discussed, the role of exercise with an ultimate goal of at least 250-300 minutes a week for weight loss and weight maintenance.    Follow-up in 6 months    "

## 2025-04-07 NOTE — CARE PLAN
Refill request noted.  PMDP reviewed.  Next visit due July.    Pain contract  n/a     The patient's goals for the shift include      The clinical goals for the shift include pain management    Pt will have less anxiety and diet advanced today  Problem: Fall/Injury  Goal: Not fall by end of shift  Outcome: Progressing  Goal: Be free from injury by end of the shift  Outcome: Progressing  Goal: Verbalize understanding of personal risk factors for fall in the hospital  Outcome: Progressing  Goal: Verbalize understanding of risk factor reduction measures to prevent injury from fall in the home  Outcome: Progressing  Goal: Use assistive devices by end of the shift  Outcome: Progressing  Goal: Pace activities to prevent fatigue by end of the shift  Outcome: Progressing     Problem: Pain  Goal: Takes deep breaths with improved pain control throughout the shift  Outcome: Progressing  Goal: Turns in bed with improved pain control throughout the shift  Outcome: Progressing  Goal: Walks with improved pain control throughout the shift  Outcome: Progressing  Goal: Performs ADL's with improved pain control throughout shift  Outcome: Progressing  Goal: Participates in PT with improved pain control throughout the shift  Outcome: Progressing  Goal: Free from opioid side effects throughout the shift  Outcome: Progressing  Goal: Free from acute confusion related to pain meds throughout the shift  Outcome: Progressing     Problem: Pain  Goal: My pain/discomfort is manageable  Outcome: Progressing     Problem: Safety  Goal: Patient will be injury free during hospitalization  Outcome: Progressing  Goal: I will remain free of falls  Outcome: Progressing     Problem: Daily Care  Goal: Daily care needs are met  Outcome: Progressing     Problem: Psychosocial Needs  Goal: Demonstrates ability to cope with hospitalization/illness  Outcome: Progressing  Goal: Collaborate with me, my family, and caregiver to identify my specific goals  Outcome: Progressing     Problem: Discharge Barriers  Goal: My discharge needs are met  Outcome:  Progressing

## (undated) DEVICE — TROCAR, OPTICAL, BLADELESS, KII FIOS, 5 X 100MM, THREADED

## (undated) DEVICE — CANNULA REDUCER, DAVINCI XI

## (undated) DEVICE — BINDER, ABDOMINAL, 4 PANEL, 12 X 63-74 IN

## (undated) DEVICE — LUBRICANT, WATER SOLUBLE, BACTERIOSTATIC, 2 OZ, STERILE

## (undated) DEVICE — SYRINGE, 30 CC, LUER SLIP TIP

## (undated) DEVICE — DRAPE, SHEET, THREE QUARTER, FAN FOLD, 57 X 77 IN

## (undated) DEVICE — APPLICATOR, CHLORAPREP, W/ORANGE TINT, 26ML

## (undated) DEVICE — SLEEVE, VASO PRESS, CALF GARMENT, LARGE, GREEN

## (undated) DEVICE — COVER, TIP HOT SHEARS ENDOWRIST

## (undated) DEVICE — RELOAD, SUREFORM STAPLER 60, 3.5 BLUE, DAVINCI XI

## (undated) DEVICE — PROTECTOR, HEEL/ANKLE/ELBOW, UNIVERSAL

## (undated) DEVICE — GLIDESCOPE BLADE, SPECTRUM SU, LOPRO S3

## (undated) DEVICE — SYRINGE, 30 CC, LUER LOCK

## (undated) DEVICE — KIT, LAP GASTRIC BYPASS, CUSTOM, PARMA

## (undated) DEVICE — NEEDLE, CLOSURE, OMNICLOSE

## (undated) DEVICE — LAVAGE KIT, GASTRIC, EDLICH, 34 FR, 36 IN

## (undated) DEVICE — DRAPE, ARM XI

## (undated) DEVICE — MAT, AIR TRANSFER, 39X81

## (undated) DEVICE — OBTURATOR, BLADELESS , SU

## (undated) DEVICE — NEEDLE, EPIDURAL 17G X 4 1/2 PERIFIX

## (undated) DEVICE — STAPLER, SUREFORM 60, DAVINCI XI

## (undated) DEVICE — SYRINGE, 20 CC, LUER SLIP

## (undated) DEVICE — SEAL, UNIVERSAL 5-8MM  XI

## (undated) DEVICE — MARKER, SKIN, REGULAR TIP, W/FLEXI-RULER

## (undated) DEVICE — RELOAD, SUREFORM STAPLER 60, 2.5 WHITE, DAVINCI XI

## (undated) DEVICE — DRAPE, COLUMN, DAVINCI XI

## (undated) DEVICE — SEALER, VESSEL, EXTENDED

## (undated) DEVICE — CANNULA SEAL, STAPLER, DAVINCI XI